# Patient Record
Sex: MALE | Race: OTHER | Employment: FULL TIME | URBAN - METROPOLITAN AREA
[De-identification: names, ages, dates, MRNs, and addresses within clinical notes are randomized per-mention and may not be internally consistent; named-entity substitution may affect disease eponyms.]

---

## 2019-09-01 ENCOUNTER — APPOINTMENT (OUTPATIENT)
Dept: GENERAL RADIOLOGY | Age: 19
End: 2019-09-01
Payer: COMMERCIAL

## 2019-09-01 ENCOUNTER — HOSPITAL ENCOUNTER (EMERGENCY)
Age: 19
Discharge: HOME | End: 2019-09-02
Attending: EMERGENCY MEDICINE
Payer: COMMERCIAL

## 2019-09-01 VITALS
SYSTOLIC BLOOD PRESSURE: 134 MMHG | BODY MASS INDEX: 25.18 KG/M2 | TEMPERATURE: 98.6 F | DIASTOLIC BLOOD PRESSURE: 77 MMHG | OXYGEN SATURATION: 96 % | HEIGHT: 69 IN | RESPIRATION RATE: 16 BRPM | HEART RATE: 73 BPM | WEIGHT: 170 LBS

## 2019-09-01 DIAGNOSIS — K29.70 GASTRITIS, PRESENCE OF BLEEDING UNSPECIFIED, UNSPECIFIED CHRONICITY, UNSPECIFIED GASTRITIS TYPE: Primary | ICD-10-CM

## 2019-09-01 PROCEDURE — 82248 BILIRUBIN DIRECT: CPT

## 2019-09-01 PROCEDURE — 99285 EMERGENCY DEPT VISIT HI MDM: CPT

## 2019-09-01 PROCEDURE — 83690 ASSAY OF LIPASE: CPT

## 2019-09-01 PROCEDURE — 85025 COMPLETE CBC W/AUTO DIFF WBC: CPT

## 2019-09-01 PROCEDURE — 80053 COMPREHEN METABOLIC PANEL: CPT

## 2019-09-01 RX ORDER — ONDANSETRON 2 MG/ML
4 INJECTION INTRAMUSCULAR; INTRAVENOUS ONCE
Status: COMPLETED | OUTPATIENT
Start: 2019-09-01 | End: 2019-09-02

## 2019-09-01 RX ORDER — PANTOPRAZOLE SODIUM 40 MG/10ML
80 INJECTION, POWDER, LYOPHILIZED, FOR SOLUTION INTRAVENOUS ONCE
Status: COMPLETED | OUTPATIENT
Start: 2019-09-01 | End: 2019-09-02

## 2019-09-01 RX ORDER — 0.9 % SODIUM CHLORIDE 0.9 %
500 INTRAVENOUS SOLUTION INTRAVENOUS ONCE
Status: COMPLETED | OUTPATIENT
Start: 2019-09-01 | End: 2019-09-02

## 2019-09-01 ASSESSMENT — PAIN SCALES - GENERAL: PAINLEVEL_OUTOF10: 6

## 2019-09-01 ASSESSMENT — PAIN DESCRIPTION - DESCRIPTORS: DESCRIPTORS: SQUEEZING

## 2019-09-01 ASSESSMENT — PAIN DESCRIPTION - FREQUENCY: FREQUENCY: INTERMITTENT

## 2019-09-01 ASSESSMENT — PAIN - FUNCTIONAL ASSESSMENT: PAIN_FUNCTIONAL_ASSESSMENT: PREVENTS OR INTERFERES SOME ACTIVE ACTIVITIES AND ADLS

## 2019-09-01 ASSESSMENT — PAIN DESCRIPTION - PAIN TYPE: TYPE: ACUTE PAIN

## 2019-09-01 ASSESSMENT — PAIN DESCRIPTION - LOCATION: LOCATION: ABDOMEN

## 2019-09-01 ASSESSMENT — PAIN DESCRIPTION - ONSET: ONSET: ON-GOING

## 2019-09-01 ASSESSMENT — PAIN DESCRIPTION - PROGRESSION: CLINICAL_PROGRESSION: GRADUALLY WORSENING

## 2019-09-02 ENCOUNTER — APPOINTMENT (OUTPATIENT)
Dept: GENERAL RADIOLOGY | Age: 19
End: 2019-09-02
Payer: COMMERCIAL

## 2019-09-02 ENCOUNTER — APPOINTMENT (OUTPATIENT)
Dept: ULTRASOUND IMAGING | Age: 19
End: 2019-09-02
Payer: COMMERCIAL

## 2019-09-02 LAB
ALBUMIN SERPL-MCNC: 4.2 GM/DL (ref 3.4–5)
ALP BLD-CCNC: 80 IU/L (ref 40–129)
ALT SERPL-CCNC: 16 U/L (ref 10–40)
ANION GAP SERPL CALCULATED.3IONS-SCNC: 10 MMOL/L (ref 4–16)
AST SERPL-CCNC: 30 IU/L (ref 15–37)
BASOPHILS ABSOLUTE: 0 K/CU MM
BASOPHILS RELATIVE PERCENT: 0.3 % (ref 0–1)
BILIRUB SERPL-MCNC: 0.2 MG/DL (ref 0–1)
BILIRUBIN DIRECT: 0.2 MG/DL (ref 0–0.3)
BILIRUBIN, INDIRECT: 0 MG/DL (ref 0–0.7)
BUN BLDV-MCNC: 10 MG/DL (ref 6–23)
CALCIUM SERPL-MCNC: 8.7 MG/DL (ref 8.3–10.6)
CHLORIDE BLD-SCNC: 101 MMOL/L (ref 99–110)
CO2: 23 MMOL/L (ref 21–32)
CREAT SERPL-MCNC: 0.8 MG/DL (ref 0.9–1.3)
DIFFERENTIAL TYPE: ABNORMAL
EOSINOPHILS ABSOLUTE: 0.1 K/CU MM
EOSINOPHILS RELATIVE PERCENT: 1.6 % (ref 0–3)
GFR AFRICAN AMERICAN: >60 ML/MIN/1.73M2
GFR NON-AFRICAN AMERICAN: >60 ML/MIN/1.73M2
GLUCOSE BLD-MCNC: 105 MG/DL (ref 70–99)
HCT VFR BLD CALC: 44.4 % (ref 42–52)
HEMOGLOBIN: 14.3 GM/DL (ref 13.5–18)
IMMATURE NEUTROPHIL %: 0.3 % (ref 0–0.43)
LIPASE: 28 IU/L (ref 13–60)
LYMPHOCYTES ABSOLUTE: 2.1 K/CU MM
LYMPHOCYTES RELATIVE PERCENT: 23.5 % (ref 24–44)
MCH RBC QN AUTO: 30.6 PG (ref 27–31)
MCHC RBC AUTO-ENTMCNC: 32.2 % (ref 32–36)
MCV RBC AUTO: 94.9 FL (ref 78–100)
MONOCYTES ABSOLUTE: 0.6 K/CU MM
MONOCYTES RELATIVE PERCENT: 6.9 % (ref 0–4)
NUCLEATED RBC %: 0 %
PDW BLD-RTO: 12.5 % (ref 11.7–14.9)
PLATELET # BLD: 181 K/CU MM (ref 140–440)
PMV BLD AUTO: 9.9 FL (ref 7.5–11.1)
POTASSIUM SERPL-SCNC: 4.1 MMOL/L (ref 3.5–5.1)
RBC # BLD: 4.68 M/CU MM (ref 4.6–6.2)
SEGMENTED NEUTROPHILS ABSOLUTE COUNT: 6 K/CU MM
SEGMENTED NEUTROPHILS RELATIVE PERCENT: 67.4 % (ref 36–66)
SODIUM BLD-SCNC: 134 MMOL/L (ref 135–145)
TOTAL IMMATURE NEUTOROPHIL: 0.03 K/CU MM
TOTAL NUCLEATED RBC: 0 K/CU MM
TOTAL PROTEIN: 6.7 GM/DL (ref 6.4–8.2)
WBC # BLD: 8.9 K/CU MM (ref 4–10.5)

## 2019-09-02 PROCEDURE — C9113 INJ PANTOPRAZOLE SODIUM, VIA: HCPCS | Performed by: EMERGENCY MEDICINE

## 2019-09-02 PROCEDURE — 2580000003 HC RX 258: Performed by: EMERGENCY MEDICINE

## 2019-09-02 PROCEDURE — 96375 TX/PRO/DX INJ NEW DRUG ADDON: CPT

## 2019-09-02 PROCEDURE — 96374 THER/PROPH/DIAG INJ IV PUSH: CPT

## 2019-09-02 PROCEDURE — 71045 X-RAY EXAM CHEST 1 VIEW: CPT

## 2019-09-02 PROCEDURE — 76705 ECHO EXAM OF ABDOMEN: CPT

## 2019-09-02 PROCEDURE — 6360000002 HC RX W HCPCS: Performed by: EMERGENCY MEDICINE

## 2019-09-02 RX ORDER — ONDANSETRON 4 MG/1
4 TABLET, ORALLY DISINTEGRATING ORAL EVERY 8 HOURS PRN
Qty: 15 TABLET | Refills: 0 | Status: ON HOLD | OUTPATIENT
Start: 2019-09-02 | End: 2020-01-03 | Stop reason: HOSPADM

## 2019-09-02 RX ORDER — PANTOPRAZOLE SODIUM 40 MG/1
40 TABLET, DELAYED RELEASE ORAL
Qty: 30 TABLET | Refills: 1 | Status: ON HOLD | OUTPATIENT
Start: 2019-09-02 | End: 2020-01-03 | Stop reason: SDUPTHER

## 2019-09-02 RX ADMIN — ONDANSETRON 4 MG: 2 INJECTION INTRAMUSCULAR; INTRAVENOUS at 00:02

## 2019-09-02 RX ADMIN — PANTOPRAZOLE SODIUM 80 MG: 40 INJECTION, POWDER, FOR SOLUTION INTRAVENOUS at 00:02

## 2019-09-02 RX ADMIN — SODIUM CHLORIDE 500 ML: 9 INJECTION, SOLUTION INTRAVENOUS at 00:02

## 2019-09-02 ASSESSMENT — ENCOUNTER SYMPTOMS
NAUSEA: 1
SORE THROAT: 0
COUGH: 0
BACK PAIN: 0
VOMITING: 1
ABDOMINAL PAIN: 1
SHORTNESS OF BREATH: 0
COLOR CHANGE: 0

## 2019-09-02 NOTE — ED PROVIDER NOTES
Temp Source 09/01/19 2330 Oral      SpO2 09/01/19 2330 96 %      Weight 09/01/19 2329 170 lb (77.1 kg)      Height 09/01/19 2329 5' 9\" (1.753 m)      Head Circumference --       Peak Flow --       Pain Score --       Pain Loc --       Pain Edu? --       Excl. in [address]? --      Physical Exam   Constitutional:   Non-toxic appearance, able to converse with me   HENT:   Head: Normocephalic and atraumatic. Mouth/Throat: No oropharyngeal exudate. Eyes: Right eye exhibits no discharge. Left eye exhibits no discharge. Patient is tracking me as I am walking around the room without noted EOM palsy   Neck: No tracheal deviation present. Cardiovascular: Intact distal pulses. Exam reveals no gallop and no friction rub. Pulmonary/Chest: No respiratory distress. He has no wheezes. He has no rales. Abdominal: Soft. There is tenderness (epigastric). There is no guarding. Musculoskeletal: He exhibits no tenderness or deformity. Neurological: He is alert. Skin: Skin is warm and dry.    Psychiatric: Judgment normal.            I have reviewed and interpreted all of the currently available lab results from this visit (if applicable):  Results for orders placed or performed during the hospital encounter of 09/01/19   CBC Auto Differential   Result Value Ref Range    WBC 8.9 4.0 - 10.5 K/CU MM    RBC 4.68 4.6 - 6.2 M/CU MM    Hemoglobin 14.3 13.5 - 18.0 GM/DL    Hematocrit 44.4 42 - 52 %    MCV 94.9 78 - 100 FL    MCH 30.6 27 - 31 PG    MCHC 32.2 32.0 - 36.0 %    RDW 12.5 11.7 - 14.9 %    Platelets 256 259 - 365 K/CU MM    MPV 9.9 7.5 - 11.1 FL    Differential Type AUTOMATED DIFFERENTIAL     Segs Relative 67.4 (H) 36 - 66 %    Lymphocytes % 23.5 (L) 24 - 44 %    Monocytes % 6.9 (H) 0 - 4 %    Eosinophils % 1.6 0 - 3 %    Basophils % 0.3 0 - 1 %    Segs Absolute 6.0 K/CU MM    Lymphocytes Absolute 2.1 K/CU MM    Monocytes Absolute 0.6 K/CU MM    Eosinophils Absolute 0.1 K/CU MM    Basophils Absolute 0.0 K/CU MM    Nucleated

## 2019-10-19 ENCOUNTER — HOSPITAL ENCOUNTER (EMERGENCY)
Age: 19
Discharge: HOME | End: 2019-10-19
Attending: EMERGENCY MEDICINE
Payer: COMMERCIAL

## 2019-10-19 VITALS
DIASTOLIC BLOOD PRESSURE: 88 MMHG | TEMPERATURE: 98.3 F | OXYGEN SATURATION: 99 % | WEIGHT: 170 LBS | BODY MASS INDEX: 25.18 KG/M2 | HEIGHT: 69 IN | SYSTOLIC BLOOD PRESSURE: 137 MMHG | HEART RATE: 63 BPM | RESPIRATION RATE: 18 BRPM

## 2019-10-19 DIAGNOSIS — Z86.59 HISTORY OF POST TRAUMATIC STRESS DISORDER: Primary | ICD-10-CM

## 2019-10-19 PROCEDURE — 99281 EMR DPT VST MAYX REQ PHY/QHP: CPT

## 2019-12-17 ENCOUNTER — APPOINTMENT (OUTPATIENT)
Dept: GENERAL RADIOLOGY | Age: 19
End: 2019-12-17
Payer: COMMERCIAL

## 2019-12-17 ENCOUNTER — HOSPITAL ENCOUNTER (EMERGENCY)
Age: 19
Discharge: OTHER INSTITUTION | End: 2019-12-17
Attending: EMERGENCY MEDICINE
Payer: COMMERCIAL

## 2019-12-17 VITALS
HEIGHT: 69 IN | BODY MASS INDEX: 25.92 KG/M2 | OXYGEN SATURATION: 96 % | TEMPERATURE: 98.2 F | HEART RATE: 62 BPM | DIASTOLIC BLOOD PRESSURE: 87 MMHG | WEIGHT: 175 LBS | RESPIRATION RATE: 15 BRPM | SYSTOLIC BLOOD PRESSURE: 101 MMHG

## 2019-12-17 DIAGNOSIS — K92.0 HEMATEMESIS WITH NAUSEA: Primary | ICD-10-CM

## 2019-12-17 LAB
ALBUMIN SERPL-MCNC: 4.2 GM/DL (ref 3.4–5)
ALP BLD-CCNC: 76 IU/L (ref 40–129)
ALT SERPL-CCNC: 9 U/L (ref 10–40)
ANION GAP SERPL CALCULATED.3IONS-SCNC: 9 MMOL/L (ref 4–16)
AST SERPL-CCNC: 14 IU/L (ref 15–37)
BASOPHILS ABSOLUTE: 0 K/CU MM
BASOPHILS RELATIVE PERCENT: 0.3 % (ref 0–1)
BILIRUB SERPL-MCNC: 0.5 MG/DL (ref 0–1)
BUN BLDV-MCNC: 7 MG/DL (ref 6–23)
CALCIUM SERPL-MCNC: 9 MG/DL (ref 8.3–10.6)
CHLORIDE BLD-SCNC: 101 MMOL/L (ref 99–110)
CO2: 28 MMOL/L (ref 21–32)
CREAT SERPL-MCNC: 0.9 MG/DL (ref 0.9–1.3)
DIFFERENTIAL TYPE: ABNORMAL
EOSINOPHILS ABSOLUTE: 0 K/CU MM
EOSINOPHILS RELATIVE PERCENT: 0.5 % (ref 0–3)
GFR AFRICAN AMERICAN: >60 ML/MIN/1.73M2
GFR NON-AFRICAN AMERICAN: >60 ML/MIN/1.73M2
GLUCOSE BLD-MCNC: 96 MG/DL (ref 70–99)
HCT VFR BLD CALC: 49 % (ref 42–52)
HEMOGLOBIN: 15.9 GM/DL (ref 13.5–18)
IMMATURE NEUTROPHIL %: 0.4 % (ref 0–0.43)
LYMPHOCYTES ABSOLUTE: 2.2 K/CU MM
LYMPHOCYTES RELATIVE PERCENT: 27.7 % (ref 24–44)
MCH RBC QN AUTO: 30 PG (ref 27–31)
MCHC RBC AUTO-ENTMCNC: 32.4 % (ref 32–36)
MCV RBC AUTO: 92.5 FL (ref 78–100)
MONOCYTES ABSOLUTE: 0.5 K/CU MM
MONOCYTES RELATIVE PERCENT: 6.1 % (ref 0–4)
NUCLEATED RBC %: 0 %
PDW BLD-RTO: 11.9 % (ref 11.7–14.9)
PLATELET # BLD: 189 K/CU MM (ref 140–440)
PMV BLD AUTO: 9.2 FL (ref 7.5–11.1)
POTASSIUM SERPL-SCNC: 3.6 MMOL/L (ref 3.5–5.1)
RBC # BLD: 5.3 M/CU MM (ref 4.6–6.2)
SEGMENTED NEUTROPHILS ABSOLUTE COUNT: 5.1 K/CU MM
SEGMENTED NEUTROPHILS RELATIVE PERCENT: 65 % (ref 36–66)
SODIUM BLD-SCNC: 138 MMOL/L (ref 135–145)
TOTAL IMMATURE NEUTOROPHIL: 0.03 K/CU MM
TOTAL NUCLEATED RBC: 0 K/CU MM
TOTAL PROTEIN: 6.8 GM/DL (ref 6.4–8.2)
WBC # BLD: 7.8 K/CU MM (ref 4–10.5)

## 2019-12-17 PROCEDURE — 99285 EMERGENCY DEPT VISIT HI MDM: CPT

## 2019-12-17 PROCEDURE — 74018 RADEX ABDOMEN 1 VIEW: CPT

## 2019-12-17 PROCEDURE — 80053 COMPREHEN METABOLIC PANEL: CPT

## 2019-12-17 PROCEDURE — 6370000000 HC RX 637 (ALT 250 FOR IP): Performed by: EMERGENCY MEDICINE

## 2019-12-17 PROCEDURE — 85025 COMPLETE CBC W/AUTO DIFF WBC: CPT

## 2019-12-17 RX ORDER — FAMOTIDINE 20 MG/1
20 TABLET, FILM COATED ORAL ONCE
Status: COMPLETED | OUTPATIENT
Start: 2019-12-17 | End: 2019-12-17

## 2019-12-17 RX ORDER — ONDANSETRON 4 MG/1
4 TABLET, ORALLY DISINTEGRATING ORAL ONCE
Status: COMPLETED | OUTPATIENT
Start: 2019-12-17 | End: 2019-12-17

## 2019-12-17 RX ORDER — DICYCLOMINE HCL 20 MG
20 TABLET ORAL ONCE
Status: COMPLETED | OUTPATIENT
Start: 2019-12-17 | End: 2019-12-17

## 2019-12-17 RX ADMIN — ONDANSETRON 4 MG: 4 TABLET, ORALLY DISINTEGRATING ORAL at 01:27

## 2019-12-17 RX ADMIN — FAMOTIDINE 20 MG: 20 TABLET, FILM COATED ORAL at 01:36

## 2019-12-17 RX ADMIN — DICYCLOMINE HYDROCHLORIDE 20 MG: 20 TABLET ORAL at 01:36

## 2019-12-17 ASSESSMENT — PAIN DESCRIPTION - ONSET: ONSET: ON-GOING

## 2019-12-17 ASSESSMENT — PAIN SCALES - GENERAL: PAINLEVEL_OUTOF10: 6

## 2019-12-17 ASSESSMENT — PAIN DESCRIPTION - FREQUENCY: FREQUENCY: CONTINUOUS

## 2019-12-17 ASSESSMENT — PAIN DESCRIPTION - PROGRESSION: CLINICAL_PROGRESSION: NOT CHANGED

## 2019-12-17 ASSESSMENT — PAIN DESCRIPTION - DESCRIPTORS: DESCRIPTORS: ACHING

## 2019-12-17 ASSESSMENT — PAIN DESCRIPTION - PAIN TYPE: TYPE: ACUTE PAIN

## 2019-12-17 ASSESSMENT — PAIN DESCRIPTION - LOCATION: LOCATION: ABDOMEN

## 2020-01-01 ENCOUNTER — HOSPITAL ENCOUNTER (INPATIENT)
Age: 20
LOS: 2 days | Discharge: HOME | DRG: 369 | End: 2020-01-03
Attending: EMERGENCY MEDICINE | Admitting: FAMILY MEDICINE
Payer: COMMERCIAL

## 2020-01-01 ENCOUNTER — APPOINTMENT (OUTPATIENT)
Dept: CT IMAGING | Age: 20
DRG: 369 | End: 2020-01-01
Payer: COMMERCIAL

## 2020-01-01 PROBLEM — K92.0 HEMATEMESIS: Status: ACTIVE | Noted: 2020-01-01

## 2020-01-01 LAB
ABO/RH: NORMAL
ALBUMIN SERPL-MCNC: 4.9 GM/DL (ref 3.4–5)
ALP BLD-CCNC: 67 IU/L (ref 40–129)
ALT SERPL-CCNC: 11 U/L (ref 10–40)
AMPHETAMINES: NEGATIVE
ANION GAP SERPL CALCULATED.3IONS-SCNC: 13 MMOL/L (ref 4–16)
ANTIBODY SCREEN: NEGATIVE
AST SERPL-CCNC: 15 IU/L (ref 15–37)
BACTERIA: NEGATIVE /HPF
BARBITURATE SCREEN URINE: NEGATIVE
BASOPHILS ABSOLUTE: 0.1 K/CU MM
BASOPHILS RELATIVE PERCENT: 0.6 % (ref 0–1)
BENZODIAZEPINE SCREEN, URINE: NEGATIVE
BILIRUB SERPL-MCNC: 0.3 MG/DL (ref 0–1)
BILIRUBIN URINE: NEGATIVE MG/DL
BLOOD, URINE: NEGATIVE
BUN BLDV-MCNC: 10 MG/DL (ref 6–23)
CALCIUM SERPL-MCNC: 9.6 MG/DL (ref 8.3–10.6)
CANNABINOID SCREEN URINE: NEGATIVE
CHLORIDE BLD-SCNC: 99 MMOL/L (ref 99–110)
CLARITY: CLEAR
CO2: 26 MMOL/L (ref 21–32)
COCAINE METABOLITE: NEGATIVE
COLOR: YELLOW
CREAT SERPL-MCNC: 0.9 MG/DL (ref 0.9–1.3)
DIFFERENTIAL TYPE: ABNORMAL
EOSINOPHILS ABSOLUTE: 0.1 K/CU MM
EOSINOPHILS RELATIVE PERCENT: 1.2 % (ref 0–3)
GFR AFRICAN AMERICAN: >60 ML/MIN/1.73M2
GFR NON-AFRICAN AMERICAN: >60 ML/MIN/1.73M2
GLUCOSE BLD-MCNC: 88 MG/DL (ref 70–99)
GLUCOSE, URINE: NEGATIVE MG/DL
HCT VFR BLD CALC: 47.9 % (ref 42–52)
HEMOGLOBIN: 15.9 GM/DL (ref 13.5–18)
IMMATURE NEUTROPHIL %: 0.2 % (ref 0–0.43)
INR BLD: 0.94 INDEX
KETONES, URINE: NEGATIVE MG/DL
LACTATE: 1.8 MMOL/L (ref 0.4–2)
LEUKOCYTE ESTERASE, URINE: NEGATIVE
LIPASE: 17 IU/L (ref 13–60)
LYMPHOCYTES ABSOLUTE: 2.1 K/CU MM
LYMPHOCYTES RELATIVE PERCENT: 25.7 % (ref 24–44)
MCH RBC QN AUTO: 30.5 PG (ref 27–31)
MCHC RBC AUTO-ENTMCNC: 33.2 % (ref 32–36)
MCV RBC AUTO: 91.9 FL (ref 78–100)
MONOCYTES ABSOLUTE: 0.6 K/CU MM
MONOCYTES RELATIVE PERCENT: 7.4 % (ref 0–4)
MUCUS: ABNORMAL HPF
NITRITE URINE, QUANTITATIVE: NEGATIVE
NUCLEATED RBC %: 0 %
OPIATES, URINE: NEGATIVE
OXYCODONE: NORMAL
PDW BLD-RTO: 12 % (ref 11.7–14.9)
PH, URINE: 7 (ref 5–8)
PHENCYCLIDINE, URINE: NEGATIVE
PLATELET # BLD: 190 K/CU MM (ref 140–440)
PMV BLD AUTO: 10.2 FL (ref 7.5–11.1)
POTASSIUM SERPL-SCNC: 3.6 MMOL/L (ref 3.5–5.1)
PROTEIN UA: NEGATIVE MG/DL
PROTHROMBIN TIME: 11.4 SECONDS (ref 11.7–14.5)
RBC # BLD: 5.21 M/CU MM (ref 4.6–6.2)
RBC URINE: 1 /HPF (ref 0–3)
SEGMENTED NEUTROPHILS ABSOLUTE COUNT: 5.4 K/CU MM
SEGMENTED NEUTROPHILS RELATIVE PERCENT: 64.9 % (ref 36–66)
SODIUM BLD-SCNC: 138 MMOL/L (ref 135–145)
SPECIFIC GRAVITY UA: 1.05 (ref 1–1.03)
SPECIFIC GRAVITY UA: ABNORMAL (ref 1–1.03)
SQUAMOUS EPITHELIAL: <1 /HPF
TOTAL IMMATURE NEUTOROPHIL: 0.02 K/CU MM
TOTAL NUCLEATED RBC: 0 K/CU MM
TOTAL PROTEIN: 7.7 GM/DL (ref 6.4–8.2)
TRICHOMONAS: ABNORMAL /HPF
UROBILINOGEN, URINE: NORMAL MG/DL (ref 0.2–1)
WBC # BLD: 8.3 K/CU MM (ref 4–10.5)
WBC UA: 2 /HPF (ref 0–2)

## 2020-01-01 PROCEDURE — 96376 TX/PRO/DX INJ SAME DRUG ADON: CPT

## 2020-01-01 PROCEDURE — 99285 EMERGENCY DEPT VISIT HI MDM: CPT

## 2020-01-01 PROCEDURE — 96375 TX/PRO/DX INJ NEW DRUG ADDON: CPT

## 2020-01-01 PROCEDURE — 85610 PROTHROMBIN TIME: CPT

## 2020-01-01 PROCEDURE — 96361 HYDRATE IV INFUSION ADD-ON: CPT

## 2020-01-01 PROCEDURE — 1200000000 HC SEMI PRIVATE

## 2020-01-01 PROCEDURE — 6360000002 HC RX W HCPCS: Performed by: NURSE PRACTITIONER

## 2020-01-01 PROCEDURE — 83605 ASSAY OF LACTIC ACID: CPT

## 2020-01-01 PROCEDURE — 86900 BLOOD TYPING SEROLOGIC ABO: CPT

## 2020-01-01 PROCEDURE — 36415 COLL VENOUS BLD VENIPUNCTURE: CPT

## 2020-01-01 PROCEDURE — C9113 INJ PANTOPRAZOLE SODIUM, VIA: HCPCS | Performed by: NURSE PRACTITIONER

## 2020-01-01 PROCEDURE — 6360000004 HC RX CONTRAST MEDICATION: Performed by: EMERGENCY MEDICINE

## 2020-01-01 PROCEDURE — 86901 BLOOD TYPING SEROLOGIC RH(D): CPT

## 2020-01-01 PROCEDURE — C9113 INJ PANTOPRAZOLE SODIUM, VIA: HCPCS | Performed by: EMERGENCY MEDICINE

## 2020-01-01 PROCEDURE — 96367 TX/PROPH/DG ADDL SEQ IV INF: CPT

## 2020-01-01 PROCEDURE — 96365 THER/PROPH/DIAG IV INF INIT: CPT

## 2020-01-01 PROCEDURE — 96374 THER/PROPH/DIAG INJ IV PUSH: CPT

## 2020-01-01 PROCEDURE — 85025 COMPLETE CBC W/AUTO DIFF WBC: CPT

## 2020-01-01 PROCEDURE — G0378 HOSPITAL OBSERVATION PER HR: HCPCS

## 2020-01-01 PROCEDURE — 81001 URINALYSIS AUTO W/SCOPE: CPT

## 2020-01-01 PROCEDURE — 86850 RBC ANTIBODY SCREEN: CPT

## 2020-01-01 PROCEDURE — 94761 N-INVAS EAR/PLS OXIMETRY MLT: CPT

## 2020-01-01 PROCEDURE — 2580000003 HC RX 258: Performed by: EMERGENCY MEDICINE

## 2020-01-01 PROCEDURE — 6360000002 HC RX W HCPCS: Performed by: EMERGENCY MEDICINE

## 2020-01-01 PROCEDURE — 83690 ASSAY OF LIPASE: CPT

## 2020-01-01 PROCEDURE — 74177 CT ABD & PELVIS W/CONTRAST: CPT

## 2020-01-01 PROCEDURE — 2580000003 HC RX 258: Performed by: NURSE PRACTITIONER

## 2020-01-01 PROCEDURE — 80053 COMPREHEN METABOLIC PANEL: CPT

## 2020-01-01 PROCEDURE — 80307 DRUG TEST PRSMV CHEM ANLYZR: CPT

## 2020-01-01 PROCEDURE — 2500000003 HC RX 250 WO HCPCS: Performed by: NURSE PRACTITIONER

## 2020-01-01 RX ORDER — PANTOPRAZOLE SODIUM 40 MG/10ML
40 INJECTION, POWDER, LYOPHILIZED, FOR SOLUTION INTRAVENOUS ONCE
Status: COMPLETED | OUTPATIENT
Start: 2020-01-01 | End: 2020-01-01

## 2020-01-01 RX ORDER — METHYLPREDNISOLONE SODIUM SUCCINATE 40 MG/ML
40 INJECTION, POWDER, LYOPHILIZED, FOR SOLUTION INTRAMUSCULAR; INTRAVENOUS EVERY 6 HOURS
Status: DISCONTINUED | OUTPATIENT
Start: 2020-01-01 | End: 2020-01-02

## 2020-01-01 RX ORDER — ACETAMINOPHEN 325 MG/1
650 TABLET ORAL EVERY 4 HOURS PRN
Status: DISCONTINUED | OUTPATIENT
Start: 2020-01-01 | End: 2020-01-03 | Stop reason: HOSPADM

## 2020-01-01 RX ORDER — SODIUM CHLORIDE 9 MG/ML
INJECTION, SOLUTION INTRAVENOUS CONTINUOUS
Status: DISPENSED | OUTPATIENT
Start: 2020-01-01 | End: 2020-01-02

## 2020-01-01 RX ORDER — SODIUM CHLORIDE 0.9 % (FLUSH) 0.9 %
10 SYRINGE (ML) INJECTION EVERY 12 HOURS SCHEDULED
Status: DISCONTINUED | OUTPATIENT
Start: 2020-01-01 | End: 2020-01-03 | Stop reason: HOSPADM

## 2020-01-01 RX ORDER — MORPHINE SULFATE 4 MG/ML
4 INJECTION, SOLUTION INTRAMUSCULAR; INTRAVENOUS EVERY 4 HOURS PRN
Status: DISCONTINUED | OUTPATIENT
Start: 2020-01-01 | End: 2020-01-03 | Stop reason: HOSPADM

## 2020-01-01 RX ORDER — PANTOPRAZOLE SODIUM 40 MG/10ML
40 INJECTION, POWDER, LYOPHILIZED, FOR SOLUTION INTRAVENOUS 2 TIMES DAILY
Status: DISCONTINUED | OUTPATIENT
Start: 2020-01-01 | End: 2020-01-03 | Stop reason: HOSPADM

## 2020-01-01 RX ORDER — 0.9 % SODIUM CHLORIDE 0.9 %
1000 INTRAVENOUS SOLUTION INTRAVENOUS ONCE
Status: COMPLETED | OUTPATIENT
Start: 2020-01-01 | End: 2020-01-01

## 2020-01-01 RX ORDER — SODIUM CHLORIDE 0.9 % (FLUSH) 0.9 %
10 SYRINGE (ML) INJECTION PRN
Status: DISCONTINUED | OUTPATIENT
Start: 2020-01-01 | End: 2020-01-03 | Stop reason: HOSPADM

## 2020-01-01 RX ORDER — CIPROFLOXACIN 2 MG/ML
400 INJECTION, SOLUTION INTRAVENOUS EVERY 12 HOURS
Status: DISCONTINUED | OUTPATIENT
Start: 2020-01-01 | End: 2020-01-03 | Stop reason: HOSPADM

## 2020-01-01 RX ORDER — ONDANSETRON 2 MG/ML
4 INJECTION INTRAMUSCULAR; INTRAVENOUS EVERY 6 HOURS PRN
Status: DISCONTINUED | OUTPATIENT
Start: 2020-01-01 | End: 2020-01-03 | Stop reason: HOSPADM

## 2020-01-01 RX ORDER — CIPROFLOXACIN 2 MG/ML
400 INJECTION, SOLUTION INTRAVENOUS EVERY 12 HOURS
Status: DISCONTINUED | OUTPATIENT
Start: 2020-01-01 | End: 2020-01-01

## 2020-01-01 RX ADMIN — PANTOPRAZOLE SODIUM 40 MG: 40 INJECTION, POWDER, FOR SOLUTION INTRAVENOUS at 10:42

## 2020-01-01 RX ADMIN — SODIUM CHLORIDE 1000 ML: 9 INJECTION, SOLUTION INTRAVENOUS at 10:42

## 2020-01-01 RX ADMIN — SODIUM CHLORIDE: 9 INJECTION, SOLUTION INTRAVENOUS at 16:40

## 2020-01-01 RX ADMIN — MORPHINE SULFATE 4 MG: 4 INJECTION, SOLUTION INTRAMUSCULAR; INTRAVENOUS at 21:35

## 2020-01-01 RX ADMIN — METRONIDAZOLE 500 MG: 500 INJECTION, SOLUTION INTRAVENOUS at 16:41

## 2020-01-01 RX ADMIN — IOPAMIDOL 100 ML: 755 INJECTION, SOLUTION INTRAVENOUS at 11:41

## 2020-01-01 RX ADMIN — SODIUM CHLORIDE, PRESERVATIVE FREE 10 ML: 5 INJECTION INTRAVENOUS at 21:36

## 2020-01-01 RX ADMIN — ONDANSETRON 4 MG: 2 INJECTION INTRAMUSCULAR; INTRAVENOUS at 10:42

## 2020-01-01 RX ADMIN — MORPHINE SULFATE 4 MG: 4 INJECTION, SOLUTION INTRAMUSCULAR; INTRAVENOUS at 15:32

## 2020-01-01 RX ADMIN — CIPROFLOXACIN 400 MG: 2 INJECTION, SOLUTION INTRAVENOUS at 17:49

## 2020-01-01 RX ADMIN — METHYLPREDNISOLONE SODIUM SUCCINATE 40 MG: 40 INJECTION, POWDER, FOR SOLUTION INTRAMUSCULAR; INTRAVENOUS at 16:34

## 2020-01-01 RX ADMIN — ONDANSETRON 4 MG: 2 INJECTION INTRAMUSCULAR; INTRAVENOUS at 15:32

## 2020-01-01 RX ADMIN — PANTOPRAZOLE SODIUM 40 MG: 40 INJECTION, POWDER, FOR SOLUTION INTRAVENOUS at 21:35

## 2020-01-01 RX ADMIN — METHYLPREDNISOLONE SODIUM SUCCINATE 40 MG: 40 INJECTION, POWDER, FOR SOLUTION INTRAMUSCULAR; INTRAVENOUS at 21:35

## 2020-01-01 ASSESSMENT — PAIN SCALES - GENERAL
PAINLEVEL_OUTOF10: 9
PAINLEVEL_OUTOF10: 8
PAINLEVEL_OUTOF10: 7
PAINLEVEL_OUTOF10: 7

## 2020-01-01 ASSESSMENT — PAIN DESCRIPTION - FREQUENCY: FREQUENCY: CONTINUOUS

## 2020-01-01 ASSESSMENT — PAIN DESCRIPTION - PAIN TYPE
TYPE: ACUTE PAIN
TYPE: ACUTE PAIN

## 2020-01-01 ASSESSMENT — PAIN DESCRIPTION - LOCATION
LOCATION: ABDOMEN
LOCATION: ABDOMEN

## 2020-01-01 ASSESSMENT — PAIN DESCRIPTION - PROGRESSION: CLINICAL_PROGRESSION: GRADUALLY WORSENING

## 2020-01-01 ASSESSMENT — PAIN DESCRIPTION - ONSET: ONSET: PROGRESSIVE

## 2020-01-01 ASSESSMENT — PAIN DESCRIPTION - DESCRIPTORS: DESCRIPTORS: SHARP

## 2020-01-01 NOTE — ED NOTES
Bed: H-02  Expected date: 1/1/20  Expected time:   Means of arrival: [facility] Department  Comments:     Daniele Vasquez.  TATI Arana  01/01/20 1016

## 2020-01-01 NOTE — ED PROVIDER NOTES
Triage Chief Complaint:   Abdominal Pain and Emesis      HPI:  Rena Moscoso is a 22 y.o. male that presents with abdominal pain, hematemesis. He states that he started getting sick a few days ago. States his abdomen started become more distended, painful all over. States he started having nausea with vomiting. States that this morning he threw up quite a bit of bright red blood. States that he had some blood in his stool intermittently in the last few days. States it was diarrhea like stool, sometimes with blood and sometimes dark brown. He reports history of Crohn's disease, has been taking Protonix but has not been taking the Strattera which he was on previously. He is currently incarcerated. He is not followed up with his GI and a while, used to see one in [place], does not have one in this area. He is not on any blood thinners. He denies any history of surgery associated with Crohn's disease or other abdominal surgeries. He denies foreign body ingestion. Reports occasional alcohol use, denies daily use.       ROS:  General:  No fevers, no chills  Eyes:  No recent vison changes  ENT:  No sore throat, no nasal congestion  Cardiovascular:  No chest pain, no palpitations  Respiratory:  No shortness of breath, no cough, no wheezing  Gastrointestinal:  + pain, + nausea, + vomiting, + diarrhea  Musculoskeletal:  No muscle pain, no joint pain  Skin:  No rash, no pruritis, no easy bruising  Neurologic:  No headache or weakness  Genitourinary:  No dysuria, no hematuria  Extremities:  no edema, no pain    Past Medical History:   Diagnosis Date    ADHD (attention deficit hyperactivity disorder)     Conduct disorder      Past Surgical History:   Procedure Laterality Date    SHOULDER ARTHROSCOPY Right 6-    TEAR DUCT SURGERY      flushed     Family History   Adopted: Yes     Social History     Socioeconomic History    Marital status: Single     Spouse name: Not on file    Number of children: Not on file    Years of education: Not on file    Highest education level: Not on file   Occupational History    Not on file   Social Needs    Financial resource strain: Not on file    Food insecurity:     Worry: Not on file     Inability: Not on file    Transportation needs:     Medical: Not on file     Non-medical: Not on file   Tobacco Use    Smoking status: Current Every Day Smoker     Packs/day: 2.00     Types: Cigarettes    Smokeless tobacco: Former User   Substance and Sexual Activity    Alcohol use:  Yes    Drug use: Yes     Types: Marijuana     Comment: Meth    Sexual activity: Not on file   Lifestyle    Physical activity:     Days per week: Not on file     Minutes per session: Not on file    Stress: Not on file   Relationships    Social connections:     Talks on phone: Not on file     Gets together: Not on file     Attends Anabaptist service: Not on file     Active member of club or organization: Not on file     Attends meetings of clubs or organizations: Not on file     Relationship status: Not on file    Intimate partner violence:     Fear of current or ex partner: Not on file     Emotionally abused: Not on file     Physically abused: Not on file     Forced sexual activity: Not on file   Other Topics Concern    Not on file   Social History Narrative    Not on file     Current Facility-Administered Medications   Medication Dose Route Frequency Provider Last Rate Last Dose    0.9 % sodium chloride bolus  1,000 mL Intravenous Once Antelmo Sprain, DO        pantoprazole (PROTONIX) injection 40 mg  40 mg Intravenous Once Antelmo Sprain, DO        ondansetron [facility]) injection 4 mg  4 mg Intravenous Q6H PRN Antelmo Sprain, DO         Current Outpatient Medications   Medication Sig Dispense Refill    ondansetron (ZOFRAN ODT) 4 MG disintegrating tablet Take 1 tablet by mouth every 8 hours as needed for Nausea 15 tablet 0    hyoscyamine (LEVSIN/SL) 125 MCG sublingual tablet Place 1 tablet under the tongue every 4 hours as needed for Cramping 15 tablet 3    pantoprazole (PROTONIX) 40 MG tablet Take 1 tablet by mouth every morning (before breakfast) 30 tablet 1    cyclobenzaprine (FLEXERIL) 10 MG tablet Take 1 tablet by mouth 3 times daily as needed for Muscle spasms 15 tablet 0    omeprazole (PRILOSEC) 20 MG delayed release capsule Take 1 capsule by mouth daily 30 capsule 0    ondansetron (ZOFRAN ODT) 4 MG disintegrating tablet Take 1 tablet by mouth every 6 hours 10 tablet 0     No Known Allergies    Nursing Notes Reviewed    Physical Exam:  ED Triage Vitals [01/01/20 1017]   Enc Vitals Group      /86      Pulse 66      Resp 18      Temp 98 °F (36.7 °C)      Temp Source Oral      SpO2 98 %      Weight 140 lb (63.5 kg)      Height 5' 9\" (1.753 m)      Head Circumference       Peak Flow       Pain Score       Pain Loc       Pain Edu? Excl. in [address]? General appearance: Awake, alert, No acute distress. Neck:  Supple without rigidity  ENT: Normal posterior pharynx, moist mucus membranes  Heart:  Regular rate and rhythm, no murmurs  Respiratory:  Lungs clear to auscultation bilaterally. Normal effort. Abdominal:  moderate tenderness to palpation, decreased bowel sounds, abdomen slightly distended, slightly firm. No peritoneal signs. Back:  No CVA tenderness. Extremity: normal ROM, no edema  Skin: Warm. Dry. No rash. Neurological:  Alert and oriented. No focal deficits. Speech normal.  Psyc: Normal mood and affect    I have reviewed and interpreted all of the currently available lab results from this visit (if applicable):  No results found for this visit on 01/01/20. Chart review shows recent radiographs:  Xr Abdomen (kub) (single Ap View)    Result Date: 12/17/2019  EXAMINATION: ONE SUPINE XRAY VIEW(S) OF THE ABDOMEN 12/17/2019 1:57 am COMPARISON: None.  HISTORY: ORDERING SYSTEM PROVIDED HISTORY: swallowed fb TECHNOLOGIST PROVIDED HISTORY: Abd KUB Reason for exam:->swallowed fb

## 2020-01-01 NOTE — ED NOTES
The patient was given [employer] and the officer a boxed lunch per this nurse.       Da Bernal RN  01/01/20 [id number]

## 2020-01-01 NOTE — H&P
History and Physical      Name:  Janette Finnegan /Age/Sex: 2000  (22 y.o. male)   MRN & CSN:  [id number] & [id number] Admission Date/Time: 2020 10:15 AM   Location:  [facility]/04TR-04 PCP: Maximo López MD       Hospital Day: 1    Discussed patient and POC with Dr. Esthela De La Vega and Plan:     Janette Finnegan is a 22 y.o.  male  who presents with hematemesis    - Hematemesis/melanotic stool   Crohns flare v early SBO  CT ab/pelv: enteritis v early SBO  1 episode hematemesis today; ~ 3 of melanotic stool over the last 3 days  denies trauma, ingestion of foreign body  Protonix iv bid  Check ESR, CRP  Gi consult  IVF   NPO  Get type and screen  Check H & H at 2100 today; CBC am  Empiric flagyl and cipro    - Crohn's dz  No medical tx recently  Has not established with GI since moving to area  Empiric solumedrol 40 qid       Discussed patient with ED physician    Diet Clear liquid diet; NPO at mn   DVT Prophylaxis [] Lovenox, []  Heparin, [] SCDs, [x] Ambulation   GI Prophylaxis [x] PPI,  [] H2 Blocker,  [] Carafate,  [] Diet/Tube Feeds   Code Status Full    Disposition Patient requires continued admission due to hematemesis   MDM [] Low, [x] Moderate,[]  High  Patient's risk as above due to      [] One or more chronic illnesses with exacerbation progression      [x] Two or more stable chronic illnesses      [x] Undiagnosed new problem with uncertain prognosis      [] Elective major surgery      []Prescription drug management     History of Present Illness:     Chief Complaint: hematemesis    Janette Finnegan is a 22 y.o.  male  who presents from home with hematemesis, 1 episode, today. Context is, patient is in [facility], had 1 small episode of hematemesis today, witnessed by [facility] officers. He denies trauma, ingestion of foreign body. States he has hx of Crohn's disease.   He has not been treated medically for some time, though he at one time was apparently a prescription medication he does not know the name surgery and Shoulder arthroscopy (Right, 6-). Allergies: No Known Allergies    FAM HX: family history is not on file. He was adopted. Soc HX:   Social History     Socioeconomic History    Marital status: Single     Spouse name: None    Number of children: None    Years of education: None    Highest education level: None   Occupational History    None   Social Needs    Financial resource strain: None    Food insecurity:     Worry: None     Inability: None    Transportation needs:     Medical: None     Non-medical: None   Tobacco Use    Smoking status: Current Every Day Smoker     Packs/day: 2.00     Types: Cigarettes    Smokeless tobacco: Former User   Substance and Sexual Activity    Alcohol use:  Yes    Drug use: Yes     Types: Marijuana     Comment: Meth    Sexual activity: None   Lifestyle    Physical activity:     Days per week: None     Minutes per session: None    Stress: None   Relationships    Social connections:     Talks on phone: None     Gets together: None     Attends Religion service: None     Active member of club or organization: None     Attends meetings of clubs or organizations: None     Relationship status: None    Intimate partner violence:     Fear of current or ex partner: None     Emotionally abused: None     Physically abused: None     Forced sexual activity: None   Other Topics Concern    None   Social History Narrative    None       Medications:   Medications:      hyoscyamine (LEVSIN/SL) 125 MCG sublingual tablet Place 1 tablet under the tongue every 4 hours as needed for Cramping Yen Sepulveda MD Needs Review   pantoprazole (PROTONIX) 40 MG tablet Take 1 tablet by mouth every morning (before breakfast) Yen Sepulveda MD Needs Review   omeprazole (PRILOSEC) 20 MG delayed release capsule Take 1 capsule by mouth daily Hermelinda Suarez PA-C Needs Review   ondansetron (ZOFRAN ODT) 4 MG disintegrating tablet Take 1 tablet by mouth every 6 hours Hermelinda Zelaya

## 2020-01-01 NOTE — ED NOTES
Urinal given. The deputy removed one handcuff so the patient could hold the urinal. The ankle shackle is still on the right ankle. The patient is complaining of pain to the LLQ of the abd.      Jen Mejia RN  01/01/20 0093

## 2020-01-02 VITALS
BODY MASS INDEX: 20.73 KG/M2 | HEART RATE: 64 BPM | OXYGEN SATURATION: 98 % | SYSTOLIC BLOOD PRESSURE: 108 MMHG | TEMPERATURE: 97.9 F | WEIGHT: 140 LBS | HEIGHT: 69 IN | RESPIRATION RATE: 16 BRPM | DIASTOLIC BLOOD PRESSURE: 53 MMHG

## 2020-01-02 PROBLEM — Z87.19 HX OF CROHN'S DISEASE: Status: ACTIVE | Noted: 2020-01-02

## 2020-01-02 LAB
ADENOVIRUS F 40 41 PCR: NOT DETECTED
ANION GAP SERPL CALCULATED.3IONS-SCNC: 13 MMOL/L (ref 4–16)
ASTROVIRUS PCR: NOT DETECTED
BASOPHILS ABSOLUTE: 0 K/CU MM
BASOPHILS RELATIVE PERCENT: 0 % (ref 0–1)
BUN BLDV-MCNC: 10 MG/DL (ref 6–23)
CALCIUM SERPL-MCNC: 9.8 MG/DL (ref 8.3–10.6)
CAMPYLOBACTER PCR: NOT DETECTED
CHLORIDE BLD-SCNC: 101 MMOL/L (ref 99–110)
CO2: 25 MMOL/L (ref 21–32)
CREAT SERPL-MCNC: 0.9 MG/DL (ref 0.9–1.3)
CRYPTOSPORIDIUM PCR: NOT DETECTED
CYCLOSPORA CAYETANENSIS PCR: NOT DETECTED
DIFFERENTIAL TYPE: ABNORMAL
E COLI 0157 PCR: NOT DETECTED
E COLI ENTEROAGGREGATIVE PCR: NOT DETECTED
E COLI ENTEROPATHOGENIC PCR: NOT DETECTED
E COLI ENTEROTOXIGENIC PCR: NOT DETECTED
E COLI SHIGA LIKE TOXIN PCR: NOT DETECTED
E COLI SHIGELLA/ENTEROINVASIVE PCR: NOT DETECTED
ENTAMOEBA HISTOLYTICA PCR: NOT DETECTED
EOSINOPHILS ABSOLUTE: 0 K/CU MM
EOSINOPHILS RELATIVE PERCENT: 0 % (ref 0–3)
ERYTHROCYTE SEDIMENTATION RATE: 5 MM/HR (ref 0–15)
GFR AFRICAN AMERICAN: >60 ML/MIN/1.73M2
GFR NON-AFRICAN AMERICAN: >60 ML/MIN/1.73M2
GIARDIA LAMBLIA PCR: NOT DETECTED
GLUCOSE BLD-MCNC: 122 MG/DL (ref 70–99)
HCT VFR BLD CALC: 46.7 % (ref 42–52)
HEMOGLOBIN: 15.5 GM/DL (ref 13.5–18)
HIGH SENSITIVE C-REACTIVE PROTEIN: 0.6 MG/L
IMMATURE NEUTROPHIL %: 0.3 % (ref 0–0.43)
LYMPHOCYTES ABSOLUTE: 0.8 K/CU MM
LYMPHOCYTES RELATIVE PERCENT: 8.7 % (ref 24–44)
MCH RBC QN AUTO: 30.3 PG (ref 27–31)
MCHC RBC AUTO-ENTMCNC: 33.2 % (ref 32–36)
MCV RBC AUTO: 91.2 FL (ref 78–100)
MONOCYTES ABSOLUTE: 0.1 K/CU MM
MONOCYTES RELATIVE PERCENT: 0.7 % (ref 0–4)
NOROVIRUS GI GII PCR: NOT DETECTED
NUCLEATED RBC %: 0 %
PDW BLD-RTO: 11.9 % (ref 11.7–14.9)
PLATELET # BLD: 191 K/CU MM (ref 140–440)
PLESIOMONAS SHIGELLOIDES PCR: NOT DETECTED
PMV BLD AUTO: 9.9 FL (ref 7.5–11.1)
POTASSIUM SERPL-SCNC: 5 MMOL/L (ref 3.5–5.1)
RBC # BLD: 5.12 M/CU MM (ref 4.6–6.2)
ROTAVIRUS A PCR: NOT DETECTED
SALMONELLA PCR: NOT DETECTED
SAPOVIRUS PCR: NOT DETECTED
SEGMENTED NEUTROPHILS ABSOLUTE COUNT: 8.3 K/CU MM
SEGMENTED NEUTROPHILS RELATIVE PERCENT: 90.3 % (ref 36–66)
SODIUM BLD-SCNC: 139 MMOL/L (ref 135–145)
TOTAL IMMATURE NEUTOROPHIL: 0.03 K/CU MM
TOTAL NUCLEATED RBC: 0 K/CU MM
VIBRIO CHOLERAE PCR: NOT DETECTED
VIBRIO PCR: NOT DETECTED
WBC # BLD: 9.1 K/CU MM (ref 4–10.5)
YERSINIA ENTEROCOLITICA PCR: NOT DETECTED

## 2020-01-02 PROCEDURE — 2500000003 HC RX 250 WO HCPCS: Performed by: NURSE PRACTITIONER

## 2020-01-02 PROCEDURE — 87507 IADNA-DNA/RNA PROBE TQ 12-25: CPT

## 2020-01-02 PROCEDURE — 6360000002 HC RX W HCPCS: Performed by: NURSE PRACTITIONER

## 2020-01-02 PROCEDURE — 6370000000 HC RX 637 (ALT 250 FOR IP): Performed by: NURSE PRACTITIONER

## 2020-01-02 PROCEDURE — 96376 TX/PRO/DX INJ SAME DRUG ADON: CPT

## 2020-01-02 PROCEDURE — C9113 INJ PANTOPRAZOLE SODIUM, VIA: HCPCS | Performed by: NURSE PRACTITIONER

## 2020-01-02 PROCEDURE — 36415 COLL VENOUS BLD VENIPUNCTURE: CPT

## 2020-01-02 PROCEDURE — 86141 C-REACTIVE PROTEIN HS: CPT

## 2020-01-02 PROCEDURE — 80048 BASIC METABOLIC PNL TOTAL CA: CPT

## 2020-01-02 PROCEDURE — 96368 THER/DIAG CONCURRENT INF: CPT

## 2020-01-02 PROCEDURE — 85025 COMPLETE CBC W/AUTO DIFF WBC: CPT

## 2020-01-02 PROCEDURE — 1200000000 HC SEMI PRIVATE

## 2020-01-02 PROCEDURE — 96366 THER/PROPH/DIAG IV INF ADDON: CPT

## 2020-01-02 PROCEDURE — G0378 HOSPITAL OBSERVATION PER HR: HCPCS

## 2020-01-02 PROCEDURE — 2580000003 HC RX 258: Performed by: NURSE PRACTITIONER

## 2020-01-02 PROCEDURE — 85652 RBC SED RATE AUTOMATED: CPT

## 2020-01-02 RX ADMIN — PANTOPRAZOLE SODIUM 40 MG: 40 INJECTION, POWDER, FOR SOLUTION INTRAVENOUS at 20:49

## 2020-01-02 RX ADMIN — PANTOPRAZOLE SODIUM 40 MG: 40 INJECTION, POWDER, FOR SOLUTION INTRAVENOUS at 10:02

## 2020-01-02 RX ADMIN — CIPROFLOXACIN 400 MG: 2 INJECTION, SOLUTION INTRAVENOUS at 18:47

## 2020-01-02 RX ADMIN — MORPHINE SULFATE 4 MG: 4 INJECTION, SOLUTION INTRAMUSCULAR; INTRAVENOUS at 02:44

## 2020-01-02 RX ADMIN — MORPHINE SULFATE 4 MG: 4 INJECTION, SOLUTION INTRAMUSCULAR; INTRAVENOUS at 07:52

## 2020-01-02 RX ADMIN — MORPHINE SULFATE 4 MG: 4 INJECTION, SOLUTION INTRAMUSCULAR; INTRAVENOUS at 20:50

## 2020-01-02 RX ADMIN — SODIUM CHLORIDE: 9 INJECTION, SOLUTION INTRAVENOUS at 04:57

## 2020-01-02 RX ADMIN — MORPHINE SULFATE 4 MG: 4 INJECTION, SOLUTION INTRAMUSCULAR; INTRAVENOUS at 11:52

## 2020-01-02 RX ADMIN — MORPHINE SULFATE 4 MG: 4 INJECTION, SOLUTION INTRAMUSCULAR; INTRAVENOUS at 15:53

## 2020-01-02 RX ADMIN — HYOSCYAMINE SULFATE 125 MCG: 0.12 TABLET, ORALLY DISINTEGRATING ORAL at 15:53

## 2020-01-02 RX ADMIN — METHYLPREDNISOLONE SODIUM SUCCINATE 40 MG: 40 INJECTION, POWDER, FOR SOLUTION INTRAMUSCULAR; INTRAVENOUS at 04:57

## 2020-01-02 RX ADMIN — METRONIDAZOLE 500 MG: 500 INJECTION, SOLUTION INTRAVENOUS at 10:02

## 2020-01-02 RX ADMIN — METRONIDAZOLE 500 MG: 500 INJECTION, SOLUTION INTRAVENOUS at 15:53

## 2020-01-02 RX ADMIN — METHYLPREDNISOLONE SODIUM SUCCINATE 40 MG: 40 INJECTION, POWDER, FOR SOLUTION INTRAMUSCULAR; INTRAVENOUS at 10:02

## 2020-01-02 RX ADMIN — CIPROFLOXACIN 400 MG: 2 INJECTION, SOLUTION INTRAVENOUS at 04:57

## 2020-01-02 RX ADMIN — HYOSCYAMINE SULFATE 125 MCG: 0.12 TABLET, ORALLY DISINTEGRATING ORAL at 21:10

## 2020-01-02 RX ADMIN — METRONIDAZOLE 500 MG: 500 INJECTION, SOLUTION INTRAVENOUS at 02:37

## 2020-01-02 ASSESSMENT — PAIN DESCRIPTION - PAIN TYPE: TYPE: CHRONIC PAIN

## 2020-01-02 ASSESSMENT — PAIN SCALES - GENERAL
PAINLEVEL_OUTOF10: 3
PAINLEVEL_OUTOF10: 2
PAINLEVEL_OUTOF10: 7
PAINLEVEL_OUTOF10: 6
PAINLEVEL_OUTOF10: 0
PAINLEVEL_OUTOF10: 7
PAINLEVEL_OUTOF10: 6
PAINLEVEL_OUTOF10: 3
PAINLEVEL_OUTOF10: 6

## 2020-01-02 ASSESSMENT — PAIN DESCRIPTION - ONSET: ONSET: ON-GOING

## 2020-01-02 ASSESSMENT — PAIN SCALES - WONG BAKER: WONGBAKER_NUMERICALRESPONSE: 0

## 2020-01-02 ASSESSMENT — PAIN DESCRIPTION - LOCATION: LOCATION: ABDOMEN

## 2020-01-02 ASSESSMENT — PAIN DESCRIPTION - FREQUENCY: FREQUENCY: CONTINUOUS

## 2020-01-02 ASSESSMENT — PAIN DESCRIPTION - DESCRIPTORS: DESCRIPTORS: SHARP

## 2020-01-02 ASSESSMENT — PAIN DESCRIPTION - DIRECTION: RADIATING_TOWARDS: CHEST
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
## 2020-01-02 NOTE — PROGRESS NOTES
Hospitalist Progress Note         Admit Date: 1/1/2020    PCP: Noreen Lamb MD     Chief Complaint   Patient presents with    Abdominal Pain    Emesis        Assessment and Plan:      Hematemesis H&H stable. ???  Lucía-Rodriguez tear-continue PPI and advance diet. GI evaluated and recommend outpatient EGD/C scope. Continue monitor for now   Hx of Crohn's disease not in flareup. Cipro/Flagyl continued as per GI. Monitor    VTE prophylaxis LMWH. Current Facility-Administered Medications   Medication Dose Route Frequency Provider Last Rate Last Dose    hyoscyamine (LEVSIN/SL) sublingual tablet 125 mcg  125 mcg Sublingual TID YENNI Reeves - CNP   125 mcg at 01/02/20 1553    ondansetron (ZOFRAN) injection 4 mg  4 mg Intravenous Q6H PRN Pegn Euler, APRN - NP   4 mg at 01/01/20 1532    sodium chloride flush 0.9 % injection 10 mL  10 mL Intravenous 2 times per day Peng Euler, APRN - NP   10 mL at 01/01/20 2136    sodium chloride flush 0.9 % injection 10 mL  10 mL Intravenous PRN Peng Euler, APRN - NP        magnesium hydroxide (MILK OF MAGNESIA) 400 MG/5ML suspension 30 mL  30 mL Oral Daily PRN Peng Euler, APRN - NP        acetaminophen (TYLENOL) tablet 650 mg  650 mg Oral Q4H PRN Peng Euler, APRN - NP        pantoprazole (PROTONIX) injection 40 mg  40 mg Intravenous BID Peng Euler, APRN - NP   40 mg at 01/02/20 1002    morphine sulfate (PF) injection 4 mg  4 mg Intravenous Q4H PRN Peng Euler, APRN - NP   4 mg at 01/02/20 1553    metronidazole (FLAGYL) 500 mg in NaCl 100 mL IVPB premix  500 mg Intravenous Q8H Peng Euler, APRN -  mL/hr at 01/02/20 1553 500 mg at 01/02/20 1553    ciprofloxacin (CIPRO) IVPB 400 mg  400 mg Intravenous Q12H Peng Euler, APRN - NP   Stopped at 01/02/20 0600       Subjective:     Patient denied any new complaints. Passing gas. No more hematemesis. Continued nonspecific abdominal pain.   No acute overnight events since admission. Objective: Intake/Output Summary (Last 24 hours) at 1/2/2020 1620  Last data filed at 1/2/2020 1407  Gross per 24 hour   Intake 1820 ml   Output --   Net 1820 ml      Vitals:   Vitals:    01/02/20 1101   BP: 119/70   Pulse: 61   Resp: 19   Temp: 98.2 °F (36.8 °C)   SpO2: 95%     Physical Exam:  General Appearance:  Mild distress 2/2 abdominal pain  Head:      Normocephalic, without obvious abnormality, atraumatic  Eyes:       Conjunctiva/corneas clear, EOM's intact  Lungs:    Clear to auscultation bilaterally, respirations unlabored  Heart:                Regular rate and rhythm, S1 and S2 normal, no murmur,   rub or gallop  Abdomen:     Mildly rigid +, nondistended/mild diffuse tenderness +, diminished BS +  Extremities:   Extremities normal, atraumatic, no cyanosis or edema  Neurological:   Grossly Intact. Significant Diagnostic Studies:   DATA:    CBC   Recent Labs     01/01/20  1020 01/02/20  0413   WBC 8.3 9.1   HGB 15.9 15.5   HCT 47.9 46.7    191      BMP   Recent Labs     01/01/20  1020 01/02/20  0413    139   K 3.6 5.0   CL 99 101   CO2 26 25   BUN 10 10   CREATININE 0.9 0.9     LFT'S   Recent Labs     01/01/20  1020   AST 15   ALT 11   BILITOT 0.3   ALKPHOS 67     COAG   Recent Labs     01/01/20  1020   INR 0.94     POC: No results found for: POCGLU  [id number]:  Lab Results   Component Value Date    LABA1C 5.0 07/14/2015     CARDIAC ENZYMES  No results for input(s): CKTOTAL, CKMB, CKMBINDEX, TROPONINI in the last 72 hours. Troponin: No results for input(s): TROPONINT in the last 72 hours. BNP: No results for input(s): PROBNP in the last 72 hours.   U/A:    Lab Results   Component Value Date    COLORU YELLOW 01/01/2020    WBCUA 2 01/01/2020    RBCUA 1 01/01/2020    MUCUS RARE 01/01/2020    BACTERIA NEGATIVE 01/01/2020    CLARITYU CLEAR 01/01/2020    SPECGRAV 1.046 01/01/2020    SPECGRAV  01/01/2020     (NOTE)  CONSIDER URINE OSMOLARITY TEST IF CLINICALLY INDICATED        LEUKOCYTESUR NEGATIVE 01/01/2020    BLOODU NEGATIVE 01/01/2020    GLUCOSEU Negative 04/07/2016       Xr Abdomen (kub) (single Ap View)    Result Date: 12/17/2019  EXAMINATION: ONE SUPINE XRAY VIEW(S) OF THE ABDOMEN 12/17/2019 1:57 am COMPARISON: None. HISTORY: ORDERING SYSTEM PROVIDED HISTORY: swallowed fb TECHNOLOGIST PROVIDED HISTORY: Abd KUB Reason for exam:->swallowed fb Reason for Exam: swallowed fb Acuity: Acute Type of Exam: Initial FINDINGS: There is a mild to moderate stool load. Bowel gas pattern is otherwise unremarkable. No evidence of a radiopaque foreign body in the abdomen or pelvis. Lung bases are clear. No acute bone finding. Mild to moderate stool load suggests constipation. No evidence of a radiopaque foreign body. Ct Abdomen Pelvis W Iv Contrast    Result Date: 1/1/2020  EXAMINATION: CT OF THE ABDOMEN AND PELVIS WITH CONTRAST 1/1/2020 11:49 am TECHNIQUE: CT of the abdomen and pelvis was performed with the administration of intravenous contrast. Multiplanar reformatted images are provided for review. Dose modulation, iterative reconstruction, and/or weight based adjustment of the mA/kV was utilized to reduce the radiation dose to as low as reasonably achievable. COMPARISON: KUB 12/17/2019. Right upper quadrant ultrasound 09/02/2019. HISTORY: ORDERING SYSTEM PROVIDED HISTORY: abdominal pain, hematemesis, history of crohns TECHNOLOGIST PROVIDED HISTORY: IV contrast only. Thank you. Reason for exam:->abdominal pain, hematemesis, history of crohns Reason for exam:->IV contrast only. Thank you. Reason for Exam: abdominal pain, hematemesis, history of crohns Acuity: Acute Type of Exam: Initial Additional signs and symptoms: 100 mLs total (IV hub broke off during first injection around 30-35 mL's), >60, 20g FINDINGS: Lower Chest: No focal consolidations or pleural effusions.  Organs: Liver, spleen, pancreas, gallbladder, adrenal glands and kidneys are unremarkable. GI/Bowel: There are some prominent air and fluid-filled loops of small bowel noted in the mid abdomen measuring up to 3.6 cm in caliber without definite wall thickening identified. No discrete transition point is identified. Prominent stool throughout large bowel. Air present in the rectum. Appendix not definitely seen, but no findings for acute appendicitis identified. Pelvis: Urinary bladder and prostate appear unremarkable. Peritoneum/Retroperitoneum: No ascites or focal fluid collections. No intraperitoneal free air. Abdominal aorta is normal in caliber. No lymphadenopathy is seen. Bones/Soft Tissues: No acute or suspicious bone or soft tissue abnormality. There are some prominent air and fluid-filled loops of small bowel noted in the mid abdomen measuring up to 3.6 cm in caliber without definite bowel wall thickening identified. No discrete transition point is seen. Findings may reflect an infectious or inflammatory enteritis to include potentially Crohn's disease given the patient's clinical history. Early or partial small bowel obstruction not entirely excluded. Continued follow-up suggested. Somewhat prominent stool throughout large bowel. Correlate clinically as to constipation. Remainder of the exam is unremarkable.            Antonio Canchola  Rounding Hospitalist

## 2020-01-02 NOTE — CONSULTS
[facility] Gastroenterology  Gastroenterology Consultation    2020  8:00 AM    Patient:    Alessio Coffey  : 2000   19 y.o. MRN: [id number]  Admitted: 2020 10:15 AM ATT: Mckenna Tucker MD   1105/1105-A  AdmitDx: Hematemesis [K92.0]  Partial small bowel obstruction ([facility] [address] [D26.405]  PCP: Erika Sanders MD    Reason for Consult:  Hematemesis, hx crohns    Requesting Physician:  Mckenna Tucker MD      History Obtained From:  Patient and review of all records    HISTORY OF PRESENT ILLNESS:                The patient is a 22 y.o. male with significant past medical history as below who presents with above mentioned causes in reason for consult. [facility]neted to [facility] for sitting up blood. Has hx of Crohns and PUD from NSAID use, Naproxen BID. Usually spits up blood but more recently. Abd pain, lower cramping, radiates to diffuse abd. Diagnostic with Crohns at age 13. Seen GI in [address] [place] [address] Dr. Faye Marion. Has been in group home since age 13. Has been on steroids, levsin, protonix, prozac for Crohns. Nelly have been on Stelara for 3 months. +Nausea and vomiting. Vomites up undigested foods. At least 4 months. Last vomited early this am, brigh red blood noted. Has not eats since admission.      Denies GERD, dyspepsia, dysphagia   Last BM yesterday, bright red blood streaks, formed     Denies History of EGD  History of colonoscopy at age 13      NSAIDs Naproxen BID   Denies Anti-platelet therapy    Non Smoker  Denies Alcohol intake    Past Medical History:        Diagnosis Date    ADHD (attention deficit hyperactivity disorder)     Conduct disorder        Past Surgical History:        Procedure Laterality Date    SHOULDER ARTHROSCOPY Right 2015    TEAR DUCT SURGERY      flushed         Current Medications:    Medications    Scheduled Medications:    sodium chloride flush  10 mL Intravenous 2 times per day    pantoprazole  40 mg Intravenous BID    distress, appears stated age   [de-identified]:    Normocephalic, atraumatic, Conjunctiva clear   Neck:   Supple, symmetrical, trachea midline   Lungs:     Clear to auscultation bilaterally, respirations unlabored   Chest Wall:    No tenderness or deformity    Heart:    Regular rate and rhythm, S1 and S2 normal   Abdomen:     Soft, tender, bowel sounds active all four quadrants,     no masses, no organomegaly, no ascites    Rectal:    Deferred   Extremities:   Extremities normal, atraumatic, no cyanosis or edema   Pulses:   2+ and symmetric all extremities   Skin:   Skin color, texture, turgor normal, no rashes or lesions       Neurologic:   No focal deficits, moving all four extremities            DATA:    ABGs: No results found for: PHART, PO2ART, OFE6ITK  CBC:   Recent Labs     01/01/20  1020 01/02/20  0413   WBC 8.3 9.1   HGB 15.9 15.5    191     BMP:    Recent Labs     01/01/20  1020 01/02/20  0413    139   K 3.6 5.0   CL 99 101   CO2 26 25   BUN 10 10   CREATININE 0.9 0.9   GLUCOSE 88 122*     Magnesium: No results found for: MG  Hepatic:   Recent Labs     01/01/20  1020   AST 15   ALT 11   BILITOT 0.3   ALKPHOS 67     Recent Labs     01/01/20  1020   LIPASE 17     Recent Labs     01/01/20  1020   PROTIME 11.4*   INR 0.94     No results for input(s): PTT in the last 72 hours. Lipids: No results for input(s): CHOL, HDL in the last 72 hours.     Invalid input(s): LDLCALCU  INR:   Recent Labs     01/01/20  1020   INR 0.94     TSH:   Lab Results   Component Value Date    TSH 6.22 07/14/2015     No intake or output data in the 24 hours ending 01/02/20 0800   sodium chloride 100 mL/hr at 01/02/20 0457       Imaging Studies: Reviewed    Patient Active Problem List   Diagnosis Code    Shoulder dislocation S43.006A    SLAP tear of shoulder S43.439A    Hematemesis K92.0     Assessment:  Hematemesis, hx crohns  CT 1/1/2020  There are some prominent air and fluid-filled loops of small bowel noted in   the mid

## 2020-01-03 LAB
HCT VFR BLD CALC: 43.5 % (ref 42–52)
HEMOGLOBIN: 14.4 GM/DL (ref 13.5–18)

## 2020-01-03 PROCEDURE — 2500000003 HC RX 250 WO HCPCS: Performed by: NURSE PRACTITIONER

## 2020-01-03 PROCEDURE — 6370000000 HC RX 637 (ALT 250 FOR IP): Performed by: NURSE PRACTITIONER

## 2020-01-03 PROCEDURE — 96376 TX/PRO/DX INJ SAME DRUG ADON: CPT

## 2020-01-03 PROCEDURE — 94761 N-INVAS EAR/PLS OXIMETRY MLT: CPT

## 2020-01-03 PROCEDURE — 36415 COLL VENOUS BLD VENIPUNCTURE: CPT

## 2020-01-03 PROCEDURE — 2580000003 HC RX 258: Performed by: NURSE PRACTITIONER

## 2020-01-03 PROCEDURE — G0378 HOSPITAL OBSERVATION PER HR: HCPCS

## 2020-01-03 PROCEDURE — 85018 HEMOGLOBIN: CPT

## 2020-01-03 PROCEDURE — 6360000002 HC RX W HCPCS: Performed by: NURSE PRACTITIONER

## 2020-01-03 PROCEDURE — C9113 INJ PANTOPRAZOLE SODIUM, VIA: HCPCS | Performed by: NURSE PRACTITIONER

## 2020-01-03 PROCEDURE — 96366 THER/PROPH/DIAG IV INF ADDON: CPT

## 2020-01-03 PROCEDURE — 85014 HEMATOCRIT: CPT

## 2020-01-03 RX ORDER — PANTOPRAZOLE SODIUM 40 MG/1
40 TABLET, DELAYED RELEASE ORAL
Qty: 30 TABLET | Refills: 1 | Status: SHIPPED | OUTPATIENT
Start: 2020-01-03 | End: 2020-06-30

## 2020-01-03 RX ORDER — ONDANSETRON 4 MG/1
4 TABLET, ORALLY DISINTEGRATING ORAL EVERY 6 HOURS
Qty: 10 TABLET | Refills: 0 | Status: SHIPPED | OUTPATIENT
Start: 2020-01-03 | End: 2020-06-30

## 2020-01-03 RX ADMIN — MORPHINE SULFATE 4 MG: 4 INJECTION, SOLUTION INTRAMUSCULAR; INTRAVENOUS at 10:09

## 2020-01-03 RX ADMIN — METRONIDAZOLE 500 MG: 500 INJECTION, SOLUTION INTRAVENOUS at 01:00

## 2020-01-03 RX ADMIN — SODIUM CHLORIDE, PRESERVATIVE FREE 10 ML: 5 INJECTION INTRAVENOUS at 09:46

## 2020-01-03 RX ADMIN — ONDANSETRON 4 MG: 2 INJECTION INTRAMUSCULAR; INTRAVENOUS at 10:09

## 2020-01-03 RX ADMIN — METRONIDAZOLE 500 MG: 500 INJECTION, SOLUTION INTRAVENOUS at 09:45

## 2020-01-03 RX ADMIN — CIPROFLOXACIN 400 MG: 2 INJECTION, SOLUTION INTRAVENOUS at 06:16

## 2020-01-03 RX ADMIN — PANTOPRAZOLE SODIUM 40 MG: 40 INJECTION, POWDER, FOR SOLUTION INTRAVENOUS at 09:46

## 2020-01-03 RX ADMIN — MORPHINE SULFATE 4 MG: 4 INJECTION, SOLUTION INTRAMUSCULAR; INTRAVENOUS at 05:21

## 2020-01-03 RX ADMIN — HYOSCYAMINE SULFATE 125 MCG: 0.12 TABLET, ORALLY DISINTEGRATING ORAL at 10:09

## 2020-01-03 RX ADMIN — MORPHINE SULFATE 4 MG: 4 INJECTION, SOLUTION INTRAMUSCULAR; INTRAVENOUS at 01:06

## 2020-01-03 ASSESSMENT — PAIN SCALES - GENERAL
PAINLEVEL_OUTOF10: 0
PAINLEVEL_OUTOF10: 0
PAINLEVEL_OUTOF10: 7
PAINLEVEL_OUTOF10: 6
PAINLEVEL_OUTOF10: 6
PAINLEVEL_OUTOF10: 2
PAINLEVEL_OUTOF10: 0
PAINLEVEL_OUTOF10: 7

## 2020-01-03 ASSESSMENT — PAIN SCALES - WONG BAKER
WONGBAKER_NUMERICALRESPONSE: 0

## 2020-01-03 NOTE — DISCHARGE SUMMARY
Mil Molina 2000 [id number]  PCP:  Antoni Gomez MD    Admit date: 1/1/2020  Admitting Physician: Sharifa Downey MD    Discharge date: 1/3/2020 Discharge Physician: Susie Urbina MD         Discharge Diagnoses. As per below    Hospital Course:  History of present illness at admission: As per H&P  Subsequent Hospital Course:     Hematemesis is a bleed from Lucía-Rodriguez tear. History of Crohn's not in flareup. DC antibiotics    On the day of discharge, pt felt better. No new complaints.     Pertinent Exam Findings on Day of Discharge:  General Appearance:    Alert, cooperative, no distress, appears stated age  Head:    Normocephalic, without obvious abnormality, atraumatic  Eyes:    PERRL, conjunctiva/corneas clear, EOM's intact  Lungs:    Clear to auscultation bilaterally, respirations unlabored   Heart:    Regular rate and rhythm, S1 and S2 normal, no murmur,   rub or gallop  Abdomen:     Soft, non-tender, bowel sounds active, no masses, no organomegaly  Extremities:   Extremities normal, atraumatic, no cyanosis or edema    Consults:  IP CONSULT TO HOSPITALIST  IP CONSULT TO GI    Patient Instructions:   Babita Hahn   Home Medication Instructions LMB:[id number]    Printed on:01/03/20 3960   Medication Information                      hyoscyamine (LEVSIN/SL) 125 MCG sublingual tablet  Place 1 tablet under the tongue every 4 hours as needed for Cramping             ondansetron (ZOFRAN ODT) 4 MG disintegrating tablet  Take 1 tablet by mouth every 6 hours             pantoprazole (PROTONIX) 40 MG tablet  Take 1 tablet by mouth every morning (before breakfast)                   Diet:  General diet    Activity:   activity as tolerated     Discharge Condition:   good    Disposition:  Correctional facility    Follow-up    Antoni Gomez MD  Schedule an appointment as soon as possible for a visit  Medical Management  [address] [facility] [address]

## 2020-01-03 NOTE — PROGRESS NOTES
Patient is currently an inmate in a correctional facility. Shift assessment done. Patients right leg is hand cuffed to bed. No redness noted to area. Officer at bedside. Patient denies any problems or concerns except for some abdominal discomfort.

## 2020-01-07 ENCOUNTER — HOSPITAL ENCOUNTER (EMERGENCY)
Age: 20
Discharge: HOME | End: 2020-01-07
Attending: EMERGENCY MEDICINE
Payer: COMMERCIAL

## 2020-01-07 ENCOUNTER — APPOINTMENT (OUTPATIENT)
Dept: CT IMAGING | Age: 20
End: 2020-01-07
Payer: COMMERCIAL

## 2020-01-07 VITALS
HEIGHT: 68 IN | TEMPERATURE: 98.4 F | RESPIRATION RATE: 17 BRPM | WEIGHT: 140 LBS | HEART RATE: 70 BPM | SYSTOLIC BLOOD PRESSURE: 122 MMHG | OXYGEN SATURATION: 96 % | DIASTOLIC BLOOD PRESSURE: 80 MMHG | BODY MASS INDEX: 21.22 KG/M2

## 2020-01-07 LAB
ALBUMIN SERPL-MCNC: 4.5 GM/DL (ref 3.4–5)
ALP BLD-CCNC: 57 IU/L (ref 40–129)
ALT SERPL-CCNC: 10 U/L (ref 10–40)
ANION GAP SERPL CALCULATED.3IONS-SCNC: 13 MMOL/L (ref 4–16)
AST SERPL-CCNC: 14 IU/L (ref 15–37)
BASOPHILS ABSOLUTE: 0 K/CU MM
BASOPHILS RELATIVE PERCENT: 0.5 % (ref 0–1)
BILIRUB SERPL-MCNC: 0.3 MG/DL (ref 0–1)
BUN BLDV-MCNC: 12 MG/DL (ref 6–23)
CALCIUM SERPL-MCNC: 9.4 MG/DL (ref 8.3–10.6)
CHLORIDE BLD-SCNC: 101 MMOL/L (ref 99–110)
CO2: 25 MMOL/L (ref 21–32)
CREAT SERPL-MCNC: 0.8 MG/DL (ref 0.9–1.3)
DIFFERENTIAL TYPE: ABNORMAL
EOSINOPHILS ABSOLUTE: 0.1 K/CU MM
EOSINOPHILS RELATIVE PERCENT: 1.8 % (ref 0–3)
GFR AFRICAN AMERICAN: >60 ML/MIN/1.73M2
GFR NON-AFRICAN AMERICAN: >60 ML/MIN/1.73M2
GLUCOSE BLD-MCNC: 89 MG/DL (ref 70–99)
HCT VFR BLD CALC: 51.8 % (ref 42–52)
HEMOGLOBIN: 16.8 GM/DL (ref 13.5–18)
IMMATURE NEUTROPHIL %: 0.5 % (ref 0–0.43)
LIPASE: 16 IU/L (ref 13–60)
LYMPHOCYTES ABSOLUTE: 2.5 K/CU MM
LYMPHOCYTES RELATIVE PERCENT: 38.3 % (ref 24–44)
MCH RBC QN AUTO: 30.2 PG (ref 27–31)
MCHC RBC AUTO-ENTMCNC: 32.4 % (ref 32–36)
MCV RBC AUTO: 93 FL (ref 78–100)
MONOCYTES ABSOLUTE: 0.4 K/CU MM
MONOCYTES RELATIVE PERCENT: 6.6 % (ref 0–4)
NUCLEATED RBC %: 0 %
PDW BLD-RTO: 11.9 % (ref 11.7–14.9)
PLATELET # BLD: 192 K/CU MM (ref 140–440)
PMV BLD AUTO: 9.7 FL (ref 7.5–11.1)
POTASSIUM SERPL-SCNC: 4.2 MMOL/L (ref 3.5–5.1)
RBC # BLD: 5.57 M/CU MM (ref 4.6–6.2)
SEGMENTED NEUTROPHILS ABSOLUTE COUNT: 3.4 K/CU MM
SEGMENTED NEUTROPHILS RELATIVE PERCENT: 52.3 % (ref 36–66)
SODIUM BLD-SCNC: 139 MMOL/L (ref 135–145)
TOTAL IMMATURE NEUTOROPHIL: 0.03 K/CU MM
TOTAL NUCLEATED RBC: 0 K/CU MM
TOTAL PROTEIN: 7.4 GM/DL (ref 6.4–8.2)
WBC # BLD: 6.5 K/CU MM (ref 4–10.5)

## 2020-01-07 PROCEDURE — 2500000003 HC RX 250 WO HCPCS: Performed by: EMERGENCY MEDICINE

## 2020-01-07 PROCEDURE — 96374 THER/PROPH/DIAG INJ IV PUSH: CPT

## 2020-01-07 PROCEDURE — 99284 EMERGENCY DEPT VISIT MOD MDM: CPT

## 2020-01-07 PROCEDURE — C9113 INJ PANTOPRAZOLE SODIUM, VIA: HCPCS | Performed by: EMERGENCY MEDICINE

## 2020-01-07 PROCEDURE — 6360000004 HC RX CONTRAST MEDICATION: Performed by: EMERGENCY MEDICINE

## 2020-01-07 PROCEDURE — 96372 THER/PROPH/DIAG INJ SC/IM: CPT

## 2020-01-07 PROCEDURE — 6360000002 HC RX W HCPCS: Performed by: EMERGENCY MEDICINE

## 2020-01-07 PROCEDURE — 85025 COMPLETE CBC W/AUTO DIFF WBC: CPT

## 2020-01-07 PROCEDURE — 96375 TX/PRO/DX INJ NEW DRUG ADDON: CPT

## 2020-01-07 PROCEDURE — 83690 ASSAY OF LIPASE: CPT

## 2020-01-07 PROCEDURE — 96376 TX/PRO/DX INJ SAME DRUG ADON: CPT

## 2020-01-07 PROCEDURE — 74177 CT ABD & PELVIS W/CONTRAST: CPT

## 2020-01-07 PROCEDURE — 2580000003 HC RX 258: Performed by: EMERGENCY MEDICINE

## 2020-01-07 PROCEDURE — 80053 COMPREHEN METABOLIC PANEL: CPT

## 2020-01-07 RX ORDER — SODIUM CHLORIDE 0.9 % (FLUSH) 0.9 %
10 SYRINGE (ML) INJECTION PRN
Status: DISCONTINUED | OUTPATIENT
Start: 2020-01-07 | End: 2020-01-07 | Stop reason: HOSPADM

## 2020-01-07 RX ORDER — DICYCLOMINE HYDROCHLORIDE 10 MG/1
10 CAPSULE ORAL 3 TIMES DAILY
Qty: 15 CAPSULE | Refills: 3 | Status: SHIPPED | OUTPATIENT
Start: 2020-01-07 | End: 2020-06-30

## 2020-01-07 RX ORDER — POLYETHYLENE GLYCOL 3350 17 G/17G
17 POWDER, FOR SOLUTION ORAL DAILY PRN
Qty: 527 G | Refills: 1 | Status: SHIPPED | OUTPATIENT
Start: 2020-01-07 | End: 2020-02-06

## 2020-01-07 RX ORDER — DICYCLOMINE HYDROCHLORIDE 10 MG/ML
20 INJECTION INTRAMUSCULAR ONCE
Status: COMPLETED | OUTPATIENT
Start: 2020-01-07 | End: 2020-01-07

## 2020-01-07 RX ORDER — BISACODYL 10 MG
10 SUPPOSITORY, RECTAL RECTAL DAILY
Qty: 30 SUPPOSITORY | Refills: 0 | Status: SHIPPED | OUTPATIENT
Start: 2020-01-07 | End: 2020-02-06

## 2020-01-07 RX ORDER — ONDANSETRON 4 MG/1
4 TABLET, ORALLY DISINTEGRATING ORAL EVERY 8 HOURS PRN
Qty: 15 TABLET | Refills: 0 | Status: SHIPPED | OUTPATIENT
Start: 2020-01-07 | End: 2020-06-30

## 2020-01-07 RX ORDER — 0.9 % SODIUM CHLORIDE 0.9 %
1000 INTRAVENOUS SOLUTION INTRAVENOUS ONCE
Status: COMPLETED | OUTPATIENT
Start: 2020-01-07 | End: 2020-01-07

## 2020-01-07 RX ORDER — PANTOPRAZOLE SODIUM 40 MG/10ML
40 INJECTION, POWDER, LYOPHILIZED, FOR SOLUTION INTRAVENOUS ONCE
Status: COMPLETED | OUTPATIENT
Start: 2020-01-07 | End: 2020-01-07

## 2020-01-07 RX ORDER — PANTOPRAZOLE SODIUM 20 MG/1
20 TABLET, DELAYED RELEASE ORAL DAILY
Qty: 30 TABLET | Refills: 0 | Status: SHIPPED | OUTPATIENT
Start: 2020-01-07 | End: 2020-06-30

## 2020-01-07 RX ORDER — MORPHINE SULFATE 4 MG/ML
4 INJECTION, SOLUTION INTRAMUSCULAR; INTRAVENOUS EVERY 30 MIN PRN
Status: DISCONTINUED | OUTPATIENT
Start: 2020-01-07 | End: 2020-01-07 | Stop reason: HOSPADM

## 2020-01-07 RX ORDER — ACETAMINOPHEN 325 MG/1
650 TABLET ORAL EVERY 6 HOURS PRN
Qty: 120 TABLET | Refills: 3 | Status: SHIPPED | OUTPATIENT
Start: 2020-01-07 | End: 2020-06-30

## 2020-01-07 RX ORDER — FAMOTIDINE 20 MG/1
20 TABLET, FILM COATED ORAL 2 TIMES DAILY
Qty: 14 TABLET | Refills: 0 | Status: SHIPPED | OUTPATIENT
Start: 2020-01-07 | End: 2020-06-30

## 2020-01-07 RX ORDER — ONDANSETRON 2 MG/ML
4 INJECTION INTRAMUSCULAR; INTRAVENOUS EVERY 30 MIN PRN
Status: DISCONTINUED | OUTPATIENT
Start: 2020-01-07 | End: 2020-01-07 | Stop reason: HOSPADM

## 2020-01-07 RX ADMIN — MORPHINE SULFATE 4 MG: 4 INJECTION, SOLUTION INTRAMUSCULAR; INTRAVENOUS at 13:50

## 2020-01-07 RX ADMIN — IOPAMIDOL 75 ML: 755 INJECTION, SOLUTION INTRAVENOUS at 14:36

## 2020-01-07 RX ADMIN — SODIUM CHLORIDE 1000 ML: 9 INJECTION, SOLUTION INTRAVENOUS at 13:25

## 2020-01-07 RX ADMIN — PANTOPRAZOLE SODIUM 40 MG: 40 INJECTION, POWDER, FOR SOLUTION INTRAVENOUS at 13:25

## 2020-01-07 RX ADMIN — ONDANSETRON 4 MG: 2 INJECTION INTRAMUSCULAR; INTRAVENOUS at 13:24

## 2020-01-07 RX ADMIN — FAMOTIDINE 20 MG: 10 INJECTION, SOLUTION INTRAVENOUS at 13:24

## 2020-01-07 RX ADMIN — Medication 10 ML: at 14:37

## 2020-01-07 RX ADMIN — ONDANSETRON 4 MG: 2 INJECTION INTRAMUSCULAR; INTRAVENOUS at 15:22

## 2020-01-07 RX ADMIN — DICYCLOMINE HYDROCHLORIDE 20 MG: 20 INJECTION INTRAMUSCULAR at 13:49

## 2020-01-07 ASSESSMENT — PAIN SCALES - GENERAL
PAINLEVEL_OUTOF10: 8
PAINLEVEL_OUTOF10: 8

## 2020-01-07 ASSESSMENT — ENCOUNTER SYMPTOMS
ALLERGIC/IMMUNOLOGIC NEGATIVE: 1
NAUSEA: 1
RESPIRATORY NEGATIVE: 1
EYES NEGATIVE: 1
VOMITING: 1
ABDOMINAL PAIN: 1

## 2020-01-07 ASSESSMENT — PAIN DESCRIPTION - LOCATION: LOCATION: ABDOMEN

## 2020-01-07 ASSESSMENT — PAIN DESCRIPTION - PAIN TYPE: TYPE: ACUTE PAIN

## 2020-01-07 NOTE — CARE COORDINATION
CM review of pt chart for readmission risk, last admission 1/1-3/20 for c/o Hematemesis. Discharged back to [facility]. Pt has returned from longterm with same complaint. Spoke with Dr Britton Pineda who anticipates pt will have to be readmitted due to GI bleed, results pending at this time.  LULURN/CM

## 2020-01-07 NOTE — ED NOTES
Dr. Brenda Yoon said ok for eating.  Pt provided with lunch box and soda per request.     Sanjay Schultz RN  01/07/20 [id number]

## 2020-01-07 NOTE — ED TRIAGE NOTES
Pt brought from the assisted by [employer]; pt states that he is having abdominal pain and that he is vomiting blood; pt has a history of stomach ulcers; pt states that 5 days ago he was admitted to the hospital and found that he had a bowel obstruction; pt was discharged within the past couple of days back to the assisted; pt states that since being admitted and discharged he has not had a bowel movement; deputy remaining with pt while here in the ER

## 2020-01-07 NOTE — ED PROVIDER NOTES
[facility]      TRIAGE CHIEF COMPLAINT:   Abdominal Pain and Hematemesis      [place]:  Belgica Lazcano is a 22 y.o. male that presents with a complaint of abdominal pain nausea vomiting hematemesis. Patient here recently for similar complaint diagnosed with a Lucía-Rodriguez tear. He has Crohn's disease. Patient is currently incarcerated. He denies any fevers chest pain shortness of breath bowel or bladder symptoms otherwise. No other questions or concerns. He has been taking his medication. REVIEW OF SYSTEMS:  At least 10 systems reviewed and otherwise acutely negative except as in the [address] Review of Systems   Constitutional: Negative. HENT: Negative. Eyes: Negative. Respiratory: Negative. Cardiovascular: Negative. Gastrointestinal: Positive for abdominal pain, nausea and vomiting. Endocrine: Negative. Genitourinary: Negative. Musculoskeletal: Negative. Skin: Negative. Allergic/Immunologic: Negative. Neurological: Negative. Hematological: Negative. Psychiatric/Behavioral: Negative. All other systems reviewed and are negative.       Past Medical History:   Diagnosis Date    ADHD (attention deficit hyperactivity disorder)     Conduct disorder      Past Surgical History:   Procedure Laterality Date    SHOULDER ARTHROSCOPY Right 6-    TEAR DUCT SURGERY      flushed     Family History   Adopted: Yes     Social History     Socioeconomic History    Marital status: Single     Spouse name: Not on file    Number of children: Not on file    Years of education: Not on file    Highest education level: Not on file   Occupational History    Not on file   Social Needs    Financial resource strain: Not on file    Food insecurity:     Worry: Not on file     Inability: Not on file    Transportation needs:     Medical: Not on file     Non-medical: Not on file   Tobacco Use    Smoking status: Current Every Day Smoker     Packs/day: 2.00 polyethylene glycol (MIRALAX) packet Take 17 g by mouth daily as needed for Other (Constipation) 527 g 1    bisacodyl (DULCOLAX) 10 MG suppository Place 1 suppository rectally daily 30 suppository 0    ondansetron (ZOFRAN ODT) 4 MG disintegrating tablet Take 1 tablet by mouth every 6 hours 10 tablet 0    pantoprazole (PROTONIX) 40 MG tablet Take 1 tablet by mouth every morning (before breakfast) 30 tablet 1    hyoscyamine (LEVSIN/SL) 125 MCG sublingual tablet Place 1 tablet under the tongue every 4 hours as needed for Cramping 15 tablet 3      No Known Allergies  Current Facility-Administered Medications   Medication Dose Route Frequency Provider Last Rate Last Dose    ondansetron (ZOFRAN) injection 4 mg  4 mg Intravenous Q30 Min PRN Urszula Gibson, DO   4 mg at 01/07/20 1522    morphine sulfate (PF) injection 4 mg  4 mg Intravenous Q30 Min PRN Urszula Gibson, DO   4 mg at 01/07/20 1350    sodium chloride flush 0.9 % injection 10 mL  10 mL Intravenous PRN Tong Perry DO   10 mL at 01/07/20 1437     Current Outpatient Medications   Medication Sig Dispense Refill    ondansetron (ZOFRAN ODT) 4 MG disintegrating tablet Take 1 tablet by mouth every 8 hours as needed for Nausea 15 tablet 0    famotidine (PEPCID) 20 MG tablet Take 1 tablet by mouth 2 times daily 14 tablet 0    pantoprazole (PROTONIX) 20 MG tablet Take 1 tablet by mouth daily 30 tablet 0    dicyclomine (BENTYL) 10 MG capsule Take 1 capsule by mouth 3 times daily As needed for abdominal pain 15 capsule 3    acetaminophen (TYLENOL) 325 MG tablet Take 2 tablets by mouth every 6 hours as needed for Pain 120 tablet 3    polyethylene glycol (MIRALAX) packet Take 17 g by mouth daily as needed for Other (Constipation) 527 g 1    bisacodyl (DULCOLAX) 10 MG suppository Place 1 suppository rectally daily 30 suppository 0    ondansetron (ZOFRAN ODT) 4 MG disintegrating tablet Take 1 tablet by mouth every 6 hours 10 tablet 0    pantoprazole (PROTONIX) 40 MG tablet Take 1 tablet by mouth every morning (before breakfast) 30 tablet 1    hyoscyamine (LEVSIN/SL) 125 MCG sublingual tablet Place 1 tablet under the tongue every 4 hours as needed for Cramping 15 tablet 3       Nursing Notes Reviewed    VITAL SIGNS:  ED Triage Vitals [01/07/20 1250]   Enc Vitals Group      /85      Pulse 87      Resp 20      Temp 98.4 °F (36.9 °C)      Temp Source Oral      SpO2 94 %      Weight 140 lb (63.5 kg)      Height 5' 8\" (1.727 m)      Head Circumference       Peak Flow       Pain Score       Pain Loc       Pain Edu? Excl. in [address]? PHYSICAL EXAM:  Physical Exam  Vitals signs and nursing note reviewed. Constitutional:       General: He is not in acute distress. Appearance: Normal appearance. He is well-developed and well-groomed. He is not ill-appearing, toxic-appearing or diaphoretic. HENT:      Head: Normocephalic and atraumatic. Right Ear: External ear normal.      Left Ear: External ear normal.      Nose: Nose normal. No congestion or rhinorrhea. Mouth/Throat:      Mouth: Mucous membranes are moist.      Pharynx: No oropharyngeal exudate or posterior oropharyngeal erythema. Eyes:      General: No scleral icterus. Right eye: No discharge. Left eye: No discharge. Extraocular Movements: Extraocular movements intact. Conjunctiva/sclera: Conjunctivae normal.      Pupils: Pupils are equal, round, and reactive to light. Neck:      Musculoskeletal: Full passive range of motion without pain and normal range of motion. Normal range of motion. No edema, erythema, neck rigidity, crepitus, injury or pain with movement. Vascular: No JVD. Trachea: Phonation normal.   Cardiovascular:      Rate and Rhythm: Normal rate and regular rhythm. Pulses: Normal pulses. Heart sounds: Normal heart sounds. No murmur. No friction rub. No gallop.     Pulmonary:      Effort: Pulmonary effort is normal. No 11.7 - 14.9 %    Platelets 860 735 - 662 K/CU MM    MPV 9.7 7.5 - 11.1 FL    Differential Type AUTOMATED DIFFERENTIAL     Segs Relative 52.3 36 - 66 %    Lymphocytes % 38.3 24 - 44 %    Monocytes % 6.6 (H) 0 - 4 %    Eosinophils % 1.8 0 - 3 %    Basophils % 0.5 0 - 1 %    Segs Absolute 3.4 K/CU MM    Lymphocytes Absolute 2.5 K/CU MM    Monocytes Absolute 0.4 K/CU MM    Eosinophils Absolute 0.1 K/CU MM    Basophils Absolute 0.0 K/CU MM    Nucleated RBC % 0.0 %    Total Nucleated RBC 0.0 K/CU MM    Total Immature Neutrophil 0.03 K/CU MM    Immature Neutrophil % 0.5 (H) 0 - 0.43 %   CMP   Result Value Ref Range    Sodium 139 135 - 145 MMOL/L    Potassium 4.2 3.5 - 5.1 MMOL/L    Chloride 101 99 - 110 mMol/L    CO2 25 21 - 32 MMOL/L    BUN 12 6 - 23 MG/DL    CREATININE 0.8 (L) 0.9 - 1.3 MG/DL    Glucose 89 70 - 99 MG/DL    Calcium 9.4 8.3 - 10.6 MG/DL    Alb 4.5 3.4 - 5.0 GM/DL    Total Protein 7.4 6.4 - 8.2 GM/DL    Total Bilirubin 0.3 0.0 - 1.0 MG/DL    ALT 10 10 - 40 U/L    AST 14 (L) 15 - 37 IU/L    Alkaline Phosphatase 57 40 - 129 IU/L    GFR Non-African American >60 >60 mL/min/1.73m2    GFR African American >60 >60 mL/min/1.73m2    Anion Gap 13 4 - 16   Lipase   Result Value Ref Range    Lipase 16 13 - 60 IU/L        Radiographs (if obtained):  [] The following radiograph was interpreted by myself in the absence of a radiologist:  [x] Radiologist's Report Reviewed:    CT Abd/pelv    Xr Abdomen (kub) (single Ap View)    Result Date: 12/17/2019  EXAMINATION: ONE SUPINE XRAY VIEW(S) OF THE ABDOMEN 12/17/2019 1:57 am COMPARISON: None. HISTORY: ORDERING SYSTEM PROVIDED HISTORY: swallowed fb TECHNOLOGIST PROVIDED HISTORY: Abd KUB Reason for exam:->swallowed fb Reason for Exam: swallowed fb Acuity: Acute Type of Exam: Initial FINDINGS: There is a mild to moderate stool load. Bowel gas pattern is otherwise unremarkable. No evidence of a radiopaque foreign body in the abdomen or pelvis. Lung bases are clear.   No acute bone finding. Mild to moderate stool load suggests constipation. No evidence of a radiopaque foreign body. Ct Abdomen Pelvis W Iv Contrast    Result Date: 1/1/2020  EXAMINATION: CT OF THE ABDOMEN AND PELVIS WITH CONTRAST 1/1/2020 11:49 am TECHNIQUE: CT of the abdomen and pelvis was performed with the administration of intravenous contrast. Multiplanar reformatted images are provided for review. Dose modulation, iterative reconstruction, and/or weight based adjustment of the mA/kV was utilized to reduce the radiation dose to as low as reasonably achievable. COMPARISON: KUB 12/17/2019. Right upper quadrant ultrasound 09/02/2019. HISTORY: ORDERING SYSTEM PROVIDED HISTORY: abdominal pain, hematemesis, history of crohns TECHNOLOGIST PROVIDED HISTORY: IV contrast only. Thank you. Reason for exam:->abdominal pain, hematemesis, history of crohns Reason for exam:->IV contrast only. Thank you. Reason for Exam: abdominal pain, hematemesis, history of crohns Acuity: Acute Type of Exam: Initial Additional signs and symptoms: 100 mLs total (IV hub broke off during first injection around 30-35 mL's), >60, 20g FINDINGS: Lower Chest: No focal consolidations or pleural effusions. Organs: Liver, spleen, pancreas, gallbladder, adrenal glands and kidneys are unremarkable. GI/Bowel: There are some prominent air and fluid-filled loops of small bowel noted in the mid abdomen measuring up to 3.6 cm in caliber without definite wall thickening identified. No discrete transition point is identified. Prominent stool throughout large bowel. Air present in the rectum. Appendix not definitely seen, but no findings for acute appendicitis identified. Pelvis: Urinary bladder and prostate appear unremarkable. Peritoneum/Retroperitoneum: No ascites or focal fluid collections. No intraperitoneal free air. Abdominal aorta is normal in caliber. No lymphadenopathy is seen.  Bones/Soft Tissues: No acute or suspicious bone or soft tissue

## 2020-06-14 ENCOUNTER — APPOINTMENT (OUTPATIENT)
Dept: CT IMAGING | Age: 20
End: 2020-06-14
Payer: COMMERCIAL

## 2020-06-14 ENCOUNTER — HOSPITAL ENCOUNTER (EMERGENCY)
Age: 20
Discharge: LEFT BEFORE DISCHARGE | End: 2020-06-14
Payer: COMMERCIAL

## 2020-06-14 VITALS
DIASTOLIC BLOOD PRESSURE: 107 MMHG | SYSTOLIC BLOOD PRESSURE: 151 MMHG | HEIGHT: 68 IN | BODY MASS INDEX: 24.25 KG/M2 | TEMPERATURE: 97.2 F | OXYGEN SATURATION: 98 % | WEIGHT: 160 LBS | HEART RATE: 61 BPM | RESPIRATION RATE: 18 BRPM

## 2020-06-14 PROCEDURE — 99284 EMERGENCY DEPT VISIT MOD MDM: CPT

## 2020-06-14 PROCEDURE — 72125 CT NECK SPINE W/O DYE: CPT

## 2020-06-14 PROCEDURE — 70450 CT HEAD/BRAIN W/O DYE: CPT

## 2020-06-14 PROCEDURE — 70486 CT MAXILLOFACIAL W/O DYE: CPT

## 2020-06-14 PROCEDURE — 6370000000 HC RX 637 (ALT 250 FOR IP): Performed by: PHYSICIAN ASSISTANT

## 2020-06-14 RX ORDER — HYDROCODONE BITARTRATE AND ACETAMINOPHEN 5; 325 MG/1; MG/1
1 TABLET ORAL ONCE
Status: COMPLETED | OUTPATIENT
Start: 2020-06-14 | End: 2020-06-14

## 2020-06-14 RX ADMIN — HYDROCODONE BITARTRATE AND ACETAMINOPHEN 1 TABLET: 5; 325 TABLET ORAL at 14:50

## 2020-06-14 ASSESSMENT — PAIN SCALES - GENERAL
PAINLEVEL_OUTOF10: 9
PAINLEVEL_OUTOF10: 9

## 2020-06-14 ASSESSMENT — PAIN DESCRIPTION - PAIN TYPE: TYPE: ACUTE PAIN

## 2020-06-14 ASSESSMENT — PAIN DESCRIPTION - LOCATION: LOCATION: EYE

## 2020-06-14 ASSESSMENT — PAIN DESCRIPTION - ORIENTATION: ORIENTATION: LEFT

## 2020-06-14 NOTE — ED PROVIDER NOTES
abused: None     Forced sexual activity: None   Other Topics Concern    None   Social History Narrative    None     Family History   Adopted: Yes       PHYSICAL EXAM    VITAL SIGNS: BP (!) 151/107   Pulse 61   Temp 97.2 °F (36.2 °C) (Oral)   Resp 18   Ht 5' 8\" (1.727 m)   Wt 160 lb (72.6 kg)   SpO2 98%   BMI 24.33 kg/m²    Constitutional:  Well developed, well nourished, no acute distress   Scalp:  No swelling, discoloration. Skin intact  Neck:   No JVD    No swelling or discoloration on inspection. + upper posterior midline neck tenderness. Full range of motion. Eyes:   Left-sided periorbital swelling and bruising. Able to see full pupil when looking at patient. PERRL. EOMI (no evidence of muscle entrapment) Funduscopic exam reveals no obvious retinal hemorrhages, defects. HENT:   Left-sided periorbital swelling and bruising. There is superficial laceration to the left lower lip without active bleeding. No trismus, airway patent. EACs and TMs clear. Nares patent bilaterally without clear fluid or blood. No mass or evidence of septal hematoma. Oropharynx clear, dentition intact   No Forbes sign,  no raccoon sign  Tenderness palpation of left maxilla and mandible and left periorbital region. Respiratory:  Lungs Clear, no retractions   Cardiovascular:  Reg rate, no murmurs  GI:  Soft, nontender, normal bowel sounds  Musculoskeletal:  No edema, no acute deformities  Integument:  Well hydrated, no petechiae   Neurologic:    - Alert & oriented person, place, time, and situation, no speech difficulties or slurring.  - No obvious gross motor deficits  - Cranial nerves 2-12 grossly intact  - Sensation intact to light touch  - Strength 5/5 in upper and lower extremities bilaterally  - Normal finger to nose test bilaterally  - Rapid alternating movements intact  - Normal heel-shin bilaterally  - No pronator drift. - Light touch sensation intact throughout.   - Upper and lower extremity DTRs 2+

## 2020-06-30 ENCOUNTER — HOSPITAL ENCOUNTER (EMERGENCY)
Age: 20
Discharge: OTHER INSTITUTION | End: 2020-07-01
Attending: EMERGENCY MEDICINE
Payer: COMMERCIAL

## 2020-06-30 ENCOUNTER — HOSPITAL ENCOUNTER (EMERGENCY)
Age: 20
Discharge: LEFT BEFORE DISCHARGE | End: 2020-06-30
Attending: EMERGENCY MEDICINE
Payer: COMMERCIAL

## 2020-06-30 LAB
ACETAMINOPHEN LEVEL: <5 UG/ML (ref 15–30)
ALBUMIN SERPL-MCNC: 4.4 GM/DL (ref 3.4–5)
ALCOHOL SCREEN SERUM: <0.01 %WT/VOL
ALP BLD-CCNC: 61 IU/L (ref 40–129)
ALT SERPL-CCNC: 16 U/L (ref 10–40)
AMPHETAMINES: ABNORMAL
ANION GAP SERPL CALCULATED.3IONS-SCNC: 10 MMOL/L (ref 4–16)
AST SERPL-CCNC: 28 IU/L (ref 15–37)
BACTERIA: NEGATIVE /HPF
BARBITURATE SCREEN URINE: NEGATIVE
BASOPHILS ABSOLUTE: 0 K/CU MM
BASOPHILS RELATIVE PERCENT: 0.4 % (ref 0–1)
BENZODIAZEPINE SCREEN, URINE: NEGATIVE
BILIRUB SERPL-MCNC: 0.6 MG/DL (ref 0–1)
BILIRUBIN URINE: NEGATIVE MG/DL
BLOOD, URINE: NEGATIVE
BUN BLDV-MCNC: 14 MG/DL (ref 6–23)
CALCIUM OXALATE CRYSTALS: ABNORMAL /HPF
CALCIUM SERPL-MCNC: 9.3 MG/DL (ref 8.3–10.6)
CANNABINOID SCREEN URINE: ABNORMAL
CHLORIDE BLD-SCNC: 101 MMOL/L (ref 99–110)
CLARITY: CLEAR
CO2: 25 MMOL/L (ref 21–32)
COCAINE METABOLITE: NEGATIVE
COLOR: YELLOW
CREAT SERPL-MCNC: 1 MG/DL (ref 0.9–1.3)
DIFFERENTIAL TYPE: ABNORMAL
DOSE AMOUNT: ABNORMAL
DOSE AMOUNT: ABNORMAL
DOSE TIME: ABNORMAL
DOSE TIME: ABNORMAL
EOSINOPHILS ABSOLUTE: 0.2 K/CU MM
EOSINOPHILS RELATIVE PERCENT: 2.4 % (ref 0–3)
GFR AFRICAN AMERICAN: >60 ML/MIN/1.73M2
GFR NON-AFRICAN AMERICAN: >60 ML/MIN/1.73M2
GLUCOSE BLD-MCNC: 81 MG/DL (ref 70–99)
GLUCOSE, URINE: NEGATIVE MG/DL
HCT VFR BLD CALC: 44.8 % (ref 42–52)
HEMOGLOBIN: 14.8 GM/DL (ref 13.5–18)
HYALINE CASTS: 10 /LPF
IMMATURE NEUTROPHIL %: 0.6 % (ref 0–0.43)
KETONES, URINE: NEGATIVE MG/DL
LEUKOCYTE ESTERASE, URINE: NEGATIVE
LYMPHOCYTES ABSOLUTE: 1.9 K/CU MM
LYMPHOCYTES RELATIVE PERCENT: 26.3 % (ref 24–44)
MCH RBC QN AUTO: 30.4 PG (ref 27–31)
MCHC RBC AUTO-ENTMCNC: 33 % (ref 32–36)
MCV RBC AUTO: 92 FL (ref 78–100)
MONOCYTES ABSOLUTE: 0.7 K/CU MM
MONOCYTES RELATIVE PERCENT: 9.2 % (ref 0–4)
MUCUS: ABNORMAL HPF
NITRITE URINE, QUANTITATIVE: NEGATIVE
NUCLEATED RBC %: 0 %
OPIATES, URINE: NEGATIVE
OXYCODONE: NEGATIVE
PDW BLD-RTO: 12.1 % (ref 11.7–14.9)
PH, URINE: 5 (ref 5–8)
PHENCYCLIDINE, URINE: NEGATIVE
PLATELET # BLD: 165 K/CU MM (ref 140–440)
PMV BLD AUTO: 9.6 FL (ref 7.5–11.1)
POTASSIUM SERPL-SCNC: 3.7 MMOL/L (ref 3.5–5.1)
PROTEIN UA: 30 MG/DL
RBC # BLD: 4.87 M/CU MM (ref 4.6–6.2)
RBC URINE: <1 /HPF (ref 0–3)
SALICYLATE LEVEL: <0.3 MG/DL (ref 15–30)
SEGMENTED NEUTROPHILS ABSOLUTE COUNT: 4.4 K/CU MM
SEGMENTED NEUTROPHILS RELATIVE PERCENT: 61.1 % (ref 36–66)
SODIUM BLD-SCNC: 136 MMOL/L (ref 135–145)
SPECIFIC GRAVITY UA: 1.03 (ref 1–1.03)
TOTAL IMMATURE NEUTOROPHIL: 0.04 K/CU MM
TOTAL NUCLEATED RBC: 0 K/CU MM
TOTAL PROTEIN: 7.1 GM/DL (ref 6.4–8.2)
TRICHOMONAS: ABNORMAL /HPF
UROBILINOGEN, URINE: 1 MG/DL (ref 0.2–1)
WBC # BLD: 7.2 K/CU MM (ref 4–10.5)
WBC UA: 1 /HPF (ref 0–2)

## 2020-06-30 PROCEDURE — G0480 DRUG TEST DEF 1-7 CLASSES: HCPCS

## 2020-06-30 PROCEDURE — 99285 EMERGENCY DEPT VISIT HI MDM: CPT

## 2020-06-30 PROCEDURE — 80053 COMPREHEN METABOLIC PANEL: CPT

## 2020-06-30 PROCEDURE — 36415 COLL VENOUS BLD VENIPUNCTURE: CPT

## 2020-06-30 PROCEDURE — 85025 COMPLETE CBC W/AUTO DIFF WBC: CPT

## 2020-06-30 PROCEDURE — 81001 URINALYSIS AUTO W/SCOPE: CPT

## 2020-06-30 PROCEDURE — 6370000000 HC RX 637 (ALT 250 FOR IP): Performed by: EMERGENCY MEDICINE

## 2020-06-30 PROCEDURE — 80307 DRUG TEST PRSMV CHEM ANLYZR: CPT

## 2020-06-30 RX ORDER — DIPHENHYDRAMINE HYDROCHLORIDE 50 MG/ML
50 INJECTION INTRAMUSCULAR; INTRAVENOUS ONCE
Status: DISCONTINUED | OUTPATIENT
Start: 2020-06-30 | End: 2020-06-30 | Stop reason: HOSPADM

## 2020-06-30 RX ORDER — QUETIAPINE FUMARATE 50 MG/1
50 TABLET, FILM COATED ORAL 2 TIMES DAILY
COMMUNITY
End: 2021-10-05

## 2020-06-30 RX ORDER — LORAZEPAM 1 MG/1
2 TABLET ORAL ONCE
Status: COMPLETED | OUTPATIENT
Start: 2020-06-30 | End: 2020-06-30

## 2020-06-30 RX ORDER — MIDAZOLAM HYDROCHLORIDE 1 MG/ML
2 INJECTION INTRAMUSCULAR; INTRAVENOUS ONCE
Status: DISCONTINUED | OUTPATIENT
Start: 2020-06-30 | End: 2020-06-30 | Stop reason: HOSPADM

## 2020-06-30 RX ORDER — HALOPERIDOL 5 MG/ML
5 INJECTION INTRAMUSCULAR ONCE
Status: DISCONTINUED | OUTPATIENT
Start: 2020-06-30 | End: 2020-06-30 | Stop reason: HOSPADM

## 2020-06-30 RX ADMIN — LORAZEPAM 2 MG: 1 TABLET ORAL at 17:31

## 2020-06-30 NOTE — ED PROVIDER NOTES
Patient arrives via police for reports of depressed mood with suicidal and homicidal ideations. However as soon as he arrived, he did elope without warning. Please had not pink slipped him and the patient was able to escape without notice. I had not seen or evaluated the patient personally. [place] [employer] was notified of the patient eloping.     Electronically signed by Deon Javed MD on 7/3/2020 at 7:02 AM       Deon Javed MD  07/03/20 7741

## 2020-06-30 NOTE — ED PROVIDER NOTES
constipation  : negative for dysuria, hematuria  Musculoskeletal: negative for myalgias, decreased ROM, joint swelling  Dermatological: negative for rash, wounds  Heme: Negative for bleeding, bruising      PAST MEDICAL HISTORY  Past Medical History:   Diagnosis Date    ADHD     ADHD (attention deficit hyperactivity disorder)     [facility]ocial personality disorder ([facility]     Bipolar disorder ([facility]     Conduct disorder     Manic depression ([facility]     OCD (obsessive compulsive disorder)     PTSD (post-traumatic stress disorder)     Schizophrenia ([facility]        CURRENT MEDICATIONS  [unfilled]    ALLERGIES  No Known Allergies    SURGICAL HISTORY  Past Surgical History:   Procedure Laterality Date    SHOULDER ARTHROSCOPY Right 6-    TEAR DUCT SURGERY      flushed       FAMILY HISTORY  Family History   Adopted: Yes       SOCIAL HISTORY  Social History     Socioeconomic History    Marital status: Single     Spouse name: Not on file    Number of children: Not on file    Years of education: Not on file    Highest education level: Not on file   Occupational History    Not on file   Social Needs    Financial resource strain: Not on file    Food insecurity     Worry: Not on file     Inability: Not on file    Transportation needs     Medical: Not on file     Non-medical: Not on file   Tobacco Use    Smoking status: Current Every Day Smoker     Packs/day: 2.00     Types: Cigarettes    Smokeless tobacco: Former User   Substance and Sexual Activity    Alcohol use:  Yes    Drug use: Yes     Types: Marijuana     Comment: Meth    Sexual activity: Not on file   Lifestyle    Physical activity     Days per week: Not on file     Minutes per session: Not on file    Stress: Not on file   Relationships    Social connections     Talks on phone: Not on file     Gets together: Not on file     Attends Hindu service: Not on file     Active member of club or organization: Not on file     Attends meetings of clubs or organizations: Not on file     Relationship status: Not on file    Intimate partner violence     Fear of current or ex partner: Not on file     Emotionally abused: Not on file     Physically abused: Not on file     Forced sexual activity: Not on file   Other Topics Concern    Not on file   Social History Narrative    Not on file         **Past medical, family and social histories, and nursing notes reviewed and verified by me**      PHYSICAL EXAM  VITAL SIGNS:   ED Triage Vitals   Enc Vitals Group      BP       Pulse       Resp       Temp       Temp src       SpO2       Weight       Height       Head Circumference       Peak Flow       Pain Score       Pain Loc       Pain Edu? Excl. in [address]? Vitals during ED course were reviewed and are as charted. Constitutional: Agitated and aggressive making threats to staff threatening to leave, non-toxic appearance  Eyes: Conjunctiva normal, No discharge  HENT: Normocephalic, Atraumatic, bilateral external ears normal, posterior oropharynx is nonerythematous and without exudate, uvula is midline, no trismus, no \"hot potato voice\" or dysphonia, oropharynx moist  Neck: Supple, no stridor, no grossly visible or palpable masses  Cardiovascular: Regular rate and rhythm, No murmurs, No rubs, No gallops  Pulmonary/Chest: Normal breath sounds, No respiratory distress or accessory muscle use, No wheezing, crackles or rhonchi. Abdomen: Soft, nondistended and nonrigid, No tenderness or peritoneal signs, No masses, normal bowel sounds  Back: No midline point tenderness, No paraspinous muscle tenderness.  No CVA tenderness  Extremities: No gross deformities, no edema, no tenderness  Neurologic: Normal motor function, Normal sensory function, No focal deficits  Skin: Warm, Dry, No erythema, No rash, No cyanosis, No mottling  Lymphatic: No lymphadenopathy in the following location(s): cervical  Psychiatric: Alert and oriented x3, Affect normal              RADIOLOGY/PROCEDURES/LABS/MEDICATIONS ADMINISTERED:    I have reviewed and interpreted all of the currently available lab results from this visit (if applicable):  Results for orders placed or performed during the hospital encounter of 06/30/20   Urinalysis with microscopic   Result Value Ref Range    Color, UA YELLOW YELLOW    Clarity, UA CLEAR CLEAR    Glucose, Urine NEGATIVE NEGATIVE MG/DL    Bilirubin Urine NEGATIVE NEGATIVE MG/DL    Ketones, Urine NEGATIVE NEGATIVE MG/DL    Specific Gravity, UA 1.027 1.001 - 1.035    Blood, Urine NEGATIVE NEGATIVE    pH, Urine 5.0 5.0 - 8.0    Protein, UA 30 (A) NEGATIVE MG/DL    Urobilinogen, Urine 1 0.2 - 1.0 MG/DL    Nitrite Urine, Quantitative NEGATIVE NEGATIVE    Leukocyte Esterase, Urine NEGATIVE NEGATIVE    RBC, UA <1 0 - 3 /HPF    WBC, UA 1 0 - 2 /HPF    Bacteria, UA NEGATIVE NEGATIVE /HPF    Mucus, UA MODERATE (A) NEGATIVE HPF    Trichomonas, UA NONE SEEN NONE SEEN /HPF    Hyaline Casts, UA 10 /LPF    Ca Oxalate Genesis, UA RARE /HPF   Drug screen multi urine   Result Value Ref Range    Cannabinoid Scrn, Ur UNCONFIRMED POSITIVE (A) NEGATIVE    Amphetamines UNCONFIRMED POSITIVE (A) NEGATIVE    Cocaine Metabolite NEGATIVE NEGATIVE    Benzodiazepine Screen, Urine NEGATIVE NEGATIVE    Barbiturate Screen, Ur NEGATIVE NEGATIVE    Opiates, Urine NEGATIVE NEGATIVE    Phencyclidine, Urine NEGATIVE NEGATIVE    Oxycodone NEGATIVE NEGATIVE          ABNORMAL LABS:  Labs Reviewed   URINALYSIS WITH MICROSCOPIC - Abnormal; Notable for the following components:       Result Value    Protein, UA 30 (*)     Mucus, UA MODERATE (*)     All other components within normal limits   URINE DRUG SCREEN - Abnormal; Notable for the following components:    Cannabinoid Scrn, Ur UNCONFIRMED POSITIVE (*)     Amphetamines UNCONFIRMED POSITIVE (*)     All other components within normal limits         IMAGING STUDIES ORDERED:  RESTRAINTS VIOLENT OR SELF-DESTRUCTIVE ADULT (OLDER THAN 17))  IP CONSULT TO PSYCHOLOGY      No orders to display         MEDICATIONS ADMINISTERED:  Medications   LORazepam (ATIVAN) tablet 2 mg (2 mg Oral Given 6/30/20 6311)         COURSE & MEDICAL DECISION MAKING  Last vitals: /70   Pulse 67   Temp 98.2 °F (36.8 °C) (Oral)   Resp 16   [id number]     23year-old male with depressed mood and suicidal ideations and possible homicidal ideations as well. No clinical evidence to suggest medical instability. Mental health specialist was consulted and has evaluated the patient. He was agitated and hostile and did require medications to help calm down. Additional workup and treatment in the ED as documented above. Pt will be admitted for further inpatient psychiatric evaluation and treatment. he is still awaiting placement. Patient signed out to Dr. Koki Liz. Clinical Impression:  1. Suicidal ideation    2. Depression, unspecified depression type        Disposition referral (if applicable):  No follow-up provider specified. Disposition medications (if applicable):  New Prescriptions    No medications on file       ED Provider Disposition Time  DISPOSITION            Electronically signed by: Leeanne Sever, M.D., 6/30/2020 11:58 PM      This dictation was created with voice recognition software. While attempts have been made to review the dictation as it is transcribed, on occasion the spoken word can be misinterpreted by the technology leading to omissions or inappropriate words, phrases or sentences.         Lorna Vila MD  06/30/20 6952       Lorna Vila MD  07/01/20 3714

## 2020-07-01 VITALS
RESPIRATION RATE: 15 BRPM | HEIGHT: 68 IN | DIASTOLIC BLOOD PRESSURE: 74 MMHG | TEMPERATURE: 98 F | WEIGHT: 160 LBS | SYSTOLIC BLOOD PRESSURE: 118 MMHG | BODY MASS INDEX: 24.25 KG/M2 | OXYGEN SATURATION: 98 % | HEART RATE: 74 BPM

## 2020-07-01 NOTE — ED NOTES
Pt resting in a position of comfort. No needs identified at this time. Bed in lowest position and sitter at bedside 1:1. Respirations even, no distress noted. Suicide precautions maintained.      Astrid Ring RN  07/01/20 0838

## 2020-07-01 NOTE — ED NOTES
Pt resting in a position of comfort. No needs identified at this time. Bed in lowest position and sitter at bedside 1:1. Respirations even, no distress noted. Suicide precautions maintained.      Avel Manuel RN  07/01/20 5350
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
## 2020-07-01 NOTE — ED PROVIDER NOTES
Patient is endorsed to me by Dr. Ralph Low at [facility]. In short, patient presented with suicidal ideation. The patient was placed in suicide precautions, patient's clothing and belongings were removed, documented and stored in the emergency department. Patient was reported to me to be medically cleared. I have examined the patient and noted a normal exam and stable vitals. Mental health have evaluated the patientand haverecommended that the patient be transferred to a inpatient psychiatric facility. We are currently awaiting placement for the patient. Patient transferred to [facility].               Alison Magaña MD  07/01/20 5459

## 2020-07-01 NOTE — ED NOTES
Unable to rouse pt from [facility] for assessment, will attempt again later     Benjmain Mccullough RN  06/30/20 2038

## 2020-07-01 NOTE — ED NOTES
Pt transported to [facility] by [facility] transport at this time.        Helga Cunha RN  07/01/20 3626

## 2020-07-01 NOTE — ED NOTES
Pt resting in a position of comfort. No needs identified at this time. Bed in lowest position and sitter at bedside 1:1. Respirations even, no distress noted. Suicide precautions maintained.      Astrid Ring RN  07/01/20 5606

## 2020-10-03 ENCOUNTER — HOSPITAL ENCOUNTER (EMERGENCY)
Age: 20
Discharge: HOME | End: 2020-10-03
Payer: COMMERCIAL

## 2020-10-03 ENCOUNTER — APPOINTMENT (OUTPATIENT)
Dept: GENERAL RADIOLOGY | Age: 20
End: 2020-10-03
Payer: COMMERCIAL

## 2020-10-03 VITALS
HEART RATE: 88 BPM | SYSTOLIC BLOOD PRESSURE: 114 MMHG | WEIGHT: 175 LBS | DIASTOLIC BLOOD PRESSURE: 81 MMHG | RESPIRATION RATE: 18 BRPM | TEMPERATURE: 98.1 F | BODY MASS INDEX: 26.52 KG/M2 | OXYGEN SATURATION: 96 % | HEIGHT: 68 IN

## 2020-10-03 PROCEDURE — 4500000028 HC INTERMEDIATE PROCEDURE

## 2020-10-03 PROCEDURE — 2500000003 HC RX 250 WO HCPCS: Performed by: PHYSICIAN ASSISTANT

## 2020-10-03 PROCEDURE — 73140 X-RAY EXAM OF FINGER(S): CPT

## 2020-10-03 PROCEDURE — 6370000000 HC RX 637 (ALT 250 FOR IP): Performed by: PHYSICIAN ASSISTANT

## 2020-10-03 PROCEDURE — 99283 EMERGENCY DEPT VISIT LOW MDM: CPT

## 2020-10-03 RX ORDER — CEPHALEXIN 500 MG/1
500 CAPSULE ORAL 4 TIMES DAILY
Qty: 28 CAPSULE | Refills: 0 | Status: SHIPPED | OUTPATIENT
Start: 2020-10-03 | End: 2020-10-10

## 2020-10-03 RX ORDER — BACITRACIN, NEOMYCIN, POLYMYXIN B 400; 3.5; 5 [USP'U]/G; MG/G; [USP'U]/G
OINTMENT TOPICAL
Qty: 1 TUBE | Refills: 0 | Status: SHIPPED | OUTPATIENT
Start: 2020-10-03 | End: 2020-10-13

## 2020-10-03 RX ORDER — DIAPER,BRIEF,INFANT-TODD,DISP
EACH MISCELLANEOUS ONCE
Status: COMPLETED | OUTPATIENT
Start: 2020-10-03 | End: 2020-10-03

## 2020-10-03 RX ORDER — CEPHALEXIN 500 MG/1
500 CAPSULE ORAL 4 TIMES DAILY
Qty: 28 CAPSULE | Refills: 0 | Status: SHIPPED | OUTPATIENT
Start: 2020-10-03 | End: 2020-10-03 | Stop reason: SDUPTHER

## 2020-10-03 RX ORDER — LIDOCAINE HYDROCHLORIDE 20 MG/ML
10 INJECTION, SOLUTION INFILTRATION; PERINEURAL ONCE
Status: COMPLETED | OUTPATIENT
Start: 2020-10-03 | End: 2020-10-03

## 2020-10-03 RX ADMIN — LIDOCAINE HYDROCHLORIDE 10 ML: 20 INJECTION, SOLUTION INFILTRATION; PERINEURAL at 13:10

## 2020-10-03 RX ADMIN — BACITRACIN ZINC 1 G: 500 OINTMENT TOPICAL at 13:49

## 2020-10-03 ASSESSMENT — PAIN SCALES - GENERAL
PAINLEVEL_OUTOF10: 8
PAINLEVEL_OUTOF10: 8

## 2020-10-03 NOTE — ED PROVIDER NOTES
eMERGENCY dEPARTMENT eNCOUnter         [address] [facility]    PCP: No primary care provider on file. CHIEF COMPLAINT    Chief Complaint   Patient presents with    Hand Pain     right 5th digit pain; smashed 2 1/2 weeks ago at work and has worsened; red and states that he has lost some since of feeling       HPI    Alexy Silva is a 23 y.o. male who presents with right fifth digit pain and swelling. Onset was prior to arrival, x2-1/2 weeks ago. Context patient states that he accidentally slammed a car door on the tip of his right fifth digit while at work. He did not seek medical evaluation imaging at that time. States the bruising resolved he has had progressively worsening pain as well as \"white\" discoloration along the nail folds. Awoke with 8/10 throbbing continuous pain throughout the distal aspect of the digit. Patient is ambidextrous. He is not a diabetic. Tetanus is reported up-to-date within the last 5 years. Patient has not noted any bloody or purulent drainage from the site. No fever chills. No other pain trauma or injury to the medial wrist, hand or wrist.  No fever or chills. No history of MRSA. REVIEW OF SYSTEMS    General: Denies fever or chills  Cardiac: Denies chest pain or chestwall pain. Pulmonary: Denies shortness of breath  GI: Denies abdominal pain, vomiting, or diarrhea  : No dysuria or hematuria    Denies any other muscles skeletal injuries, including elbow, shoulder,chest wall and back.     All other review of systems are negative  See HPI and nursing notes for additional information     PAST MEDICAL & SURGICAL HISTORY    Past Medical History:   Diagnosis Date    ADHD     ADHD (attention deficit hyperactivity disorder)     [facility]     Bipolar disorder [facility]     Conduct disorder     Manic depression ([facility]     OCD (obsessive compulsive disorder)     PTSD (post-traumatic stress disorder) None     Forced sexual activity: None   Other Topics Concern    None   Social History Narrative    None     Family History   Adopted: Yes           PHYSICAL EXAM    VITAL SIGNS: /81   Pulse 88   Temp 98.1 °F (36.7 °C) (Oral)   Resp 18   Ht 5' 8\" (1.727 m)   Wt 175 lb (79.4 kg)   SpO2 96%   BMI 26.61 kg/m²   Constitutional:  Well developed, well nourished, no acute distress, non-toxic appearance   HENT:  Atraumatic. Normocephalic. Moist mucus membranes    Musculoskeletal:    Right Hand:  Focal exam of the right fifth digit reveals no gross bony deformities. There is generalized edema and white purulence along the proximal and medial nail fold without spontaneous or expressible drainage. Localized tenderness to palpation in this area without crepitus. Pad of the digit is soft, no evidence of saline. No injury to the nailbed/nail plate, no subungual hematoma. No tenderness swelling or redness along the flexor aspect of the digit. Full range of motion of fifth digit IP and MCP joints without laxity or pain. Negative Knavels signs. Remaining digits are atraumatic nontender. Subjective decrease in station to light touch along the pad of the digit but sharp touch is intact. 5/5  strength. No wrist drop. Brisk capillary refill intact. Compartments are soft throughout the right upper extremity. No swelling, discoloration, tenderness to palpation, no range of motion deficit of the wrist or other fingers. Integument:  Well hydrated, no rash. skin intact  Vascular: affected extremity distally neurovascularly intact - sensation and capillary refill intact. Neurologic:  Alert and oriented. Appropriate thought pattern. Equal sensation over the bilateral deltoids and distally. No wrist drop. DTRs and distal sensation equal/intact.  5/5  strength of affected hand    Psychiatric: Cooperative, pleasant affect    RADIOLOGY/PROCEDURES       XR FINGER RIGHT (MIN 2 VIEWS) (Final result)   Result available for consultation, throughout entirety of  patient care. All pertinent Lab data and radiographic results reviewed with patient at bedside. The patient and/or the family were informed of the results of any tests/labs/imaging, the treatment plan, and time was allotted to answer questions. Differential diagnosis: includes but not limited to ligamentous injury, tendon injury, soft tissue contusion/hematoma, fracture, dislocation, Infection, Neurologic Deficit/Injury. Clinical  IMPRESSION    1. Paronychia of finger of right hand        Patient presents with right fifth digit distal pain and swelling after trauma 2 and half weeks ago. On exam, well-appearing nontoxic 75-year-old male, no acute distress. No gross bony deformity base extremity. There is localized swelling and white discoloration along the proximal and medial nail folds of the distal digit without evidence of subdural hematoma or nailbed injury. Pad of digit is soft, no evidence of felon. Negative Knavels signs. Full range of motion of the affected digit without pain or deficits at the IP or MCP joint. Presentation is most consistent with a posttraumatic paronychia. X-ray of the right fourth digit shows no evidence of acute osseous abnormality, soft tissue gas or osteomyelitis. I&D of paronychia was discussed with patient verbalized understanding agreement. He did tolerate procedure well. At this time, we discussed continued symptomatic care, oral antibiotics and warm water soaks with outpatient follow-up with PCP or back in the ED for serial wound rechecks. Did discuss importance of good follow-up and wound care as this could potentially lead to worsening finger infection that could lead to permanent disability, loss of vision, loss of limb or loss of life.  Low clinical suspicion for necrotizing fasciitis, sepsis, lymphangitis, felon, ischemic limb, compartment syndrome, intra-articular joint infection/septic arthritis, DVT, osteomyelitis, arterial/neurologic injury, necrotizing fasciitis, or infected/rapidly expanding hematoma. Patient is discharged in stable condition. Given prescription for topical bacitracin as well as oral Keflex. Patient agrees to have wound check in 48-72 hours, either with his PCP or back in the ED. Patient does not have PCP so I have given him/her doctor of the day contact information and encourage him/her to establish care and followup in the next 1-2 days. Encourage frequent warm compresses site to facilitate continued drainage. Return warnings back to the ED are discussed for increasing signs of infection including fever/chills, spreading redness/warmth, abdominal pain/nausea/vomiting. In light of current events, I did utilize appropriate PPE (including N95 face mask, safety glasses, gloves as recommended by the health facility/national standard best practice, during my bedside interactions with the patient. Patient was masked throughout ED course. Full droplet precaution as well as full PPE was followed throughout patient's ED course and evaluation. Diagnosis and plan discussed in detail with patient who understands and agrees. Patient agrees to return emergency department if symptoms worsen or any new symptoms develop. Comment: Please note this report has been produced using speech recognition software and may contain errors related to that system including errors in grammar, punctuation, and spelling, as well as words and phrases that may be inappropriate. If there are any questions or concerns please feel free to contact the dictating provider for clarification.           Sai Ozuna PA-C  10/03/20 2986

## 2020-12-07 ENCOUNTER — APPOINTMENT (OUTPATIENT)
Dept: GENERAL RADIOLOGY | Age: 20
End: 2020-12-07
Payer: COMMERCIAL

## 2020-12-07 ENCOUNTER — HOSPITAL ENCOUNTER (EMERGENCY)
Age: 20
Discharge: HOME | End: 2020-12-07
Attending: EMERGENCY MEDICINE
Payer: COMMERCIAL

## 2020-12-07 VITALS
OXYGEN SATURATION: 100 % | BODY MASS INDEX: 28.14 KG/M2 | WEIGHT: 190 LBS | HEIGHT: 69 IN | HEART RATE: 73 BPM | TEMPERATURE: 98 F | DIASTOLIC BLOOD PRESSURE: 84 MMHG | SYSTOLIC BLOOD PRESSURE: 128 MMHG | RESPIRATION RATE: 18 BRPM

## 2020-12-07 PROCEDURE — 73030 X-RAY EXAM OF SHOULDER: CPT

## 2020-12-07 PROCEDURE — 99285 EMERGENCY DEPT VISIT HI MDM: CPT

## 2020-12-07 RX ORDER — NABUMETONE 500 MG/1
500 TABLET, FILM COATED ORAL 2 TIMES DAILY
Qty: 20 TABLET | Refills: 1 | Status: SHIPPED | OUTPATIENT
Start: 2020-12-07 | End: 2021-10-05

## 2020-12-07 ASSESSMENT — PAIN DESCRIPTION - ORIENTATION: ORIENTATION: RIGHT

## 2020-12-07 ASSESSMENT — PAIN DESCRIPTION - LOCATION: LOCATION: SHOULDER

## 2020-12-07 ASSESSMENT — PAIN SCALES - GENERAL: PAINLEVEL_OUTOF10: 6

## 2020-12-07 ASSESSMENT — PAIN DESCRIPTION - DESCRIPTORS: DESCRIPTORS: SHARP

## 2020-12-07 ASSESSMENT — PAIN DESCRIPTION - FREQUENCY: FREQUENCY: CONTINUOUS

## 2020-12-07 NOTE — ED PROVIDER NOTES
Triage Chief Complaint:   Shoulder Injury (Pt arrives ambulatory from work stating at approx 0900 am today while lifting 80 lb part above head, felt pop to right shoulder, limited range of motion)    CAROLYNN Schwarz is a 23 y.o. male that presents to the ED with right shoulder pain happened when he was at work lifting an object he felt like something popped. Missed to having surgery many years ago x1 by an orthopedic surgeon in [facility]. He denies any weakness in his hands no numbness tingling his hand complains of numbness on palpation for slight touch sensation of the axillary nerve region. No cervical neck pain no radiculopathy. No direct injury trauma. Past Medical History:   Diagnosis Date    ADHD     ADHD (attention deficit hyperactivity disorder)     Antisocial personality disorder ([facility]     Bipolar disorder ([facility]     Conduct disorder     Depression     Manic depression ([facility])     OCD (obsessive compulsive disorder)     PTSD (post-traumatic stress disorder)     Schizophrenia ([facility]      Past Surgical History:   Procedure Laterality Date    SHOULDER ARTHROSCOPY Right 6-    TEAR DUCT SURGERY      flushed     Family History   Adopted: Yes     Social History     Socioeconomic History    Marital status: Single     Spouse name: Not on file    Number of children: Not on file    Years of education: Not on file    Highest education level: Not on file   Occupational History    Not on file   Social Needs    Financial resource strain: Not on file    Food insecurity     Worry: Not on file     Inability: Not on file    Transportation needs     Medical: Not on file     Non-medical: Not on file   Tobacco Use    Smoking status: Current Every Day Smoker     Packs/day: 0.50     Types: Cigarettes    Smokeless tobacco: Former User   Substance and Sexual Activity    Alcohol use:  Yes     Alcohol/week: 6.0 standard drinks     Types: 6 Cans of beer per week     Comment: 1-2 times weekly    Drug use: Yes     Types: Marijuana     Comment: Meth    Sexual activity: Yes     Partners: Female   Lifestyle    Physical activity     Days per week: Not on file     Minutes per session: Not on file    Stress: Not on file   Relationships    Social connections     Talks on phone: Not on file     Gets together: Not on file     Attends Hindu service: Not on file     Active member of club or organization: Not on file     Attends meetings of clubs or organizations: Not on file     Relationship status: Not on file    Intimate partner violence     Fear of current or ex partner: Not on file     Emotionally abused: Not on file     Physically abused: Not on file     Forced sexual activity: Not on file   Other Topics Concern    Not on file   Social History Narrative    Not on file     No current facility-administered medications for this encounter. Current Outpatient Medications   Medication Sig Dispense Refill    nabumetone (RELAFEN) 500 MG tablet Take 1 tablet by mouth 2 times daily 20 tablet 1    lisdexamfetamine (VYVANSE) 50 MG capsule Take 50 mg by mouth every morning.  QUEtiapine (SEROQUEL) 50 MG tablet Take 50 mg by mouth 2 times daily Takes 50 mg morning. 400 mg Night      Divalproex Sodium (DEPAKOTE PO) Take 500 mg by mouth 2 times daily 1000 mg Morning, 500 mg Night       No Known Allergies      ROS:    Review of Systems   Musculoskeletal: Positive for joint swelling. All other systems reviewed and are negative. Nursing Notes Reviewed    Physical Exam:  ED Triage Vitals   Enc Vitals Group      BP       Pulse       Resp       Temp       Temp src       SpO2       Weight       Height       Head Circumference       Peak Flow       Pain Score       Pain Loc       Pain Edu? Excl. in [facility]? Physical Exam  Vitals signs and nursing note reviewed. Exam conducted with a chaperone present. Constitutional:       Appearance: He is well-developed.    HENT:      Head: Normocephalic and atraumatic. Eyes:      Pupils: Pupils are equal, round, and reactive to light. Neck:      Musculoskeletal: Normal range of motion and neck supple. Musculoskeletal:      Right shoulder: He exhibits decreased range of motion, tenderness and pain. Arms:    Skin:     General: Skin is warm and dry. Neurological:      Mental Status: He is alert and oriented to person, place, and time. Sensory: Sensory deficit present. Motor: Motor function is intact. Deep Tendon Reflexes:      Reflex Scores:       Tricep reflexes are 2+ on the right side. Bicep reflexes are 2+ on the right side. Brachioradialis reflexes are 2+ on the right side. Comments: R axillary nerve decrease         I have reviewed and interpreted all of the currently available lab results from this visit (ifapplicable):  No results found for this visit on 12/07/20. Radiographs (if obtained):  [] The following radiograph wasinterpreted by myself in the absence of a radiologist:   [] Radiologist's Report Reviewed:  XR SHOULDER RIGHT (MIN 2 VIEWS)   Final Result   Inferior anterior subluxation of the humerus               EKG (if obtained): (All EKG's are interpreted by myself in the absence of a cardiologist)    Chart review shows recent radiographs:  No results found. MDM:      Presents with acute right shoulder pain. X-ray confirms subluxation inferiorly it is not dislocated. He will need orthopedic follow-up. Recommended rest ice  sling was given      Please note that portions of this note may have been completed with a voice recognition/dictation program. Efforts were made to edit the dictations but occasionally words are mis-transcribed.      All pertinent Lab data and radiographic results reviewed with patient at bedside. The patient and/or the family were informed of the results of any tests/labs/imaging, the treatment plan, and time was allotted to answer questions.  See chart for details of medications given during the ED stay.     The likelihood of other entities in the differential is insufficient to justify any further testing for them. This was explained to the patient. The patient was advised that persistent or worsening symptoms would require further evaluation.           Clinical Impression:  1. Strain of right shoulder, initial encounter      Disposition referral (if applicable):  Charles Templeton DO  [address] [place]edo [address] [place] [address]  [phone]    Schedule an appointment as soon as possible for a visit       Disposition medications (if applicable):  New Prescriptions    NABUMETONE (RELAFEN) 500 MG TABLET    Take 1 tablet by mouth 2 times daily           Rodriguez Fox DO, FACEP      Comment: Please note this report has been produced using speech recognition software and maycontain errors related to that system including errors in grammar, punctuation, and spelling, as well as words and phrases that may be inappropriate. If there are any questions or concerns please feel free to contact thedictating provider for clarification.         Tyson Islas DO  12/07/20 [id number]

## 2021-06-08 ENCOUNTER — HOSPITAL ENCOUNTER (EMERGENCY)
Age: 21
Discharge: LEFT BEFORE DISCHARGE | End: 2021-06-08
Attending: EMERGENCY MEDICINE
Payer: COMMERCIAL

## 2021-06-08 VITALS
SYSTOLIC BLOOD PRESSURE: 137 MMHG | HEIGHT: 69 IN | RESPIRATION RATE: 20 BRPM | HEART RATE: 103 BPM | WEIGHT: 200 LBS | DIASTOLIC BLOOD PRESSURE: 108 MMHG | OXYGEN SATURATION: 97 % | TEMPERATURE: 98.1 F | BODY MASS INDEX: 29.62 KG/M2

## 2021-06-08 DIAGNOSIS — S60.419A ABRASION OF MULTIPLE SITES OF RIGHT HAND AND FINGER, INITIAL ENCOUNTER: ICD-10-CM

## 2021-06-08 DIAGNOSIS — S09.90XA CLOSED HEAD INJURY, INITIAL ENCOUNTER: ICD-10-CM

## 2021-06-08 DIAGNOSIS — Z53.21 ELOPED FROM EMERGENCY DEPARTMENT: ICD-10-CM

## 2021-06-08 DIAGNOSIS — S01.111A LACERATION OF RIGHT EYEBROW, INITIAL ENCOUNTER: Primary | ICD-10-CM
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
## 2021-06-08 DIAGNOSIS — S60.511A ABRASION OF MULTIPLE SITES OF RIGHT HAND AND FINGER, INITIAL ENCOUNTER: ICD-10-CM

## 2021-06-08 PROCEDURE — 99283 EMERGENCY DEPT VISIT LOW MDM: CPT

## 2021-06-08 RX ORDER — AMOXICILLIN AND CLAVULANATE POTASSIUM 875; 125 MG/1; MG/1
1 TABLET, FILM COATED ORAL ONCE
Status: DISCONTINUED | OUTPATIENT
Start: 2021-06-08 | End: 2021-06-08 | Stop reason: HOSPADM

## 2021-06-08 RX ORDER — LIDOCAINE HYDROCHLORIDE AND EPINEPHRINE 10; 10 MG/ML; UG/ML
20 INJECTION, SOLUTION INFILTRATION; PERINEURAL ONCE
Status: DISCONTINUED | OUTPATIENT
Start: 2021-06-08 | End: 2021-06-08 | Stop reason: HOSPADM

## 2021-06-08 ASSESSMENT — PAIN SCALES - GENERAL: PAINLEVEL_OUTOF10: 5
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
## 2021-06-09 ENCOUNTER — APPOINTMENT (OUTPATIENT)
Dept: GENERAL RADIOLOGY | Age: 21
End: 2021-06-09
Payer: COMMERCIAL

## 2021-06-09 ENCOUNTER — APPOINTMENT (OUTPATIENT)
Dept: CT IMAGING | Age: 21
End: 2021-06-09
Payer: COMMERCIAL

## 2021-06-09 ENCOUNTER — HOSPITAL ENCOUNTER (EMERGENCY)
Age: 21
Discharge: HOME | End: 2021-06-09
Attending: EMERGENCY MEDICINE
Payer: COMMERCIAL

## 2021-06-09 VITALS
SYSTOLIC BLOOD PRESSURE: 157 MMHG | RESPIRATION RATE: 17 BRPM | BODY MASS INDEX: 28.63 KG/M2 | DIASTOLIC BLOOD PRESSURE: 99 MMHG | OXYGEN SATURATION: 98 % | HEART RATE: 70 BPM | HEIGHT: 70 IN | WEIGHT: 200 LBS | TEMPERATURE: 98.3 F

## 2021-06-09 DIAGNOSIS — S09.90XA INJURY OF HEAD, INITIAL ENCOUNTER: ICD-10-CM

## 2021-06-09 DIAGNOSIS — T14.8XXA BITE: ICD-10-CM
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
## 2021-06-09 PROCEDURE — 70450 CT HEAD/BRAIN W/O DYE: CPT

## 2021-06-09 PROCEDURE — 99285 EMERGENCY DEPT VISIT HI MDM: CPT

## 2021-06-09 PROCEDURE — 73130 X-RAY EXAM OF HAND: CPT

## 2021-06-09 PROCEDURE — 72125 CT NECK SPINE W/O DYE: CPT

## 2021-06-09 PROCEDURE — 6370000000 HC RX 637 (ALT 250 FOR IP): Performed by: EMERGENCY MEDICINE

## 2021-06-09 RX ORDER — AMOXICILLIN AND CLAVULANATE POTASSIUM 875; 125 MG/1; MG/1
1 TABLET, FILM COATED ORAL ONCE
Status: COMPLETED | OUTPATIENT
Start: 2021-06-09 | End: 2021-06-09

## 2021-06-09 RX ORDER — ACETAMINOPHEN 500 MG
1000 TABLET ORAL
Status: COMPLETED | OUTPATIENT
Start: 2021-06-09 | End: 2021-06-09

## 2021-06-09 RX ORDER — AMOXICILLIN AND CLAVULANATE POTASSIUM 875; 125 MG/1; MG/1
1 TABLET, FILM COATED ORAL 2 TIMES DAILY
Qty: 20 TABLET | Refills: 0 | Status: SHIPPED | OUTPATIENT
Start: 2021-06-09 | End: 2021-06-19

## 2021-06-09 RX ADMIN — ACETAMINOPHEN 1000 MG: 500 TABLET, FILM COATED ORAL at 06:36

## 2021-06-09 RX ADMIN — AMOXICILLIN AND CLAVULANATE POTASSIUM 1 TABLET: 875; 125 TABLET, FILM COATED ORAL at 08:07

## 2021-06-09 ASSESSMENT — PAIN SCALES - GENERAL
PAINLEVEL_OUTOF10: 7
PAINLEVEL_OUTOF10: 8
PAINLEVEL_OUTOF10: 6

## 2021-06-09 ASSESSMENT — ENCOUNTER SYMPTOMS
EYE PAIN: 0
EYE DISCHARGE: 0
BACK PAIN: 0
ABDOMINAL DISTENTION: 0
DIARRHEA: 0
COUGH: 0
SHORTNESS OF BREATH: 0
RHINORRHEA: 0
VOMITING: 0
SORE THROAT: 0

## 2021-06-09 NOTE — ED NOTES
Bed: ED-32  Expected date:   Expected time:   Means of arrival:   Comments:  EMS     Ledy Sun RN  06/09/21 7120
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
## 2021-10-05 VITALS
OXYGEN SATURATION: 97 % | HEIGHT: 71 IN | HEART RATE: 101 BPM | SYSTOLIC BLOOD PRESSURE: 165 MMHG | BODY MASS INDEX: 26.6 KG/M2 | RESPIRATION RATE: 20 BRPM | WEIGHT: 190 LBS | TEMPERATURE: 99.5 F | DIASTOLIC BLOOD PRESSURE: 81 MMHG

## 2021-10-05 PROCEDURE — 96375 TX/PRO/DX INJ NEW DRUG ADDON: CPT

## 2021-10-05 PROCEDURE — 96374 THER/PROPH/DIAG INJ IV PUSH: CPT

## 2021-10-05 PROCEDURE — 99284 EMERGENCY DEPT VISIT MOD MDM: CPT

## 2021-10-05 ASSESSMENT — PAIN DESCRIPTION - FREQUENCY: FREQUENCY: CONTINUOUS

## 2021-10-05 ASSESSMENT — PAIN DESCRIPTION - LOCATION: LOCATION: ABDOMEN

## 2021-10-05 ASSESSMENT — PAIN DESCRIPTION - PROGRESSION: CLINICAL_PROGRESSION: GRADUALLY WORSENING

## 2021-10-05 ASSESSMENT — PAIN DESCRIPTION - ORIENTATION: ORIENTATION: RIGHT

## 2021-10-05 ASSESSMENT — PAIN DESCRIPTION - ONSET: ONSET: ON-GOING

## 2021-10-05 ASSESSMENT — PAIN SCALES - GENERAL: PAINLEVEL_OUTOF10: 6

## 2021-10-05 ASSESSMENT — PAIN DESCRIPTION - PAIN TYPE: TYPE: ACUTE PAIN

## 2021-10-05 ASSESSMENT — PAIN - FUNCTIONAL ASSESSMENT: PAIN_FUNCTIONAL_ASSESSMENT: PREVENTS OR INTERFERES WITH MANY ACTIVE NOT PASSIVE ACTIVITIES

## 2021-10-05 ASSESSMENT — PAIN DESCRIPTION - DESCRIPTORS: DESCRIPTORS: PRESSURE;OTHER (COMMENT)

## 2021-10-06 ENCOUNTER — HOSPITAL ENCOUNTER (EMERGENCY)
Age: 21
Discharge: HOME | End: 2021-10-06
Attending: EMERGENCY MEDICINE
Payer: COMMERCIAL

## 2021-10-06 ENCOUNTER — APPOINTMENT (OUTPATIENT)
Dept: CT IMAGING | Age: 21
End: 2021-10-06
Payer: COMMERCIAL

## 2021-10-06 DIAGNOSIS — R10.84 GENERALIZED ABDOMINAL PAIN: ICD-10-CM

## 2021-10-06 DIAGNOSIS — R11.2 NAUSEA VOMITING AND DIARRHEA: Primary | ICD-10-CM

## 2021-10-06 DIAGNOSIS — R19.7 NAUSEA VOMITING AND DIARRHEA: Primary | ICD-10-CM

## 2021-10-06 LAB
ALBUMIN SERPL-MCNC: 4 GM/DL (ref 3.4–5)
ALP BLD-CCNC: 89 IU/L (ref 40–129)
ALT SERPL-CCNC: 90 U/L (ref 10–40)
ANION GAP SERPL CALCULATED.3IONS-SCNC: 13 MMOL/L (ref 4–16)
ANISOCYTOSIS: ABNORMAL
AST SERPL-CCNC: 79 IU/L (ref 15–37)
BACTERIA: NEGATIVE /HPF
BANDED NEUTROPHILS ABSOLUTE COUNT: 0.79 K/CU MM
BANDED NEUTROPHILS RELATIVE PERCENT: 7 % (ref 5–11)
BILIRUB SERPL-MCNC: 0.4 MG/DL (ref 0–1)
BILIRUBIN DIRECT: 0.2 MG/DL (ref 0–0.3)
BILIRUBIN URINE: NEGATIVE MG/DL
BILIRUBIN, INDIRECT: 0.2 MG/DL (ref 0–0.7)
BLOOD, URINE: NEGATIVE
BUN BLDV-MCNC: 8 MG/DL (ref 6–23)
CALCIUM SERPL-MCNC: 8.2 MG/DL (ref 8.3–10.6)
CHLORIDE BLD-SCNC: 100 MMOL/L (ref 99–110)
CLARITY: CLEAR
CO2: 21 MMOL/L (ref 21–32)
COLOR: ABNORMAL
CREAT SERPL-MCNC: 0.7 MG/DL (ref 0.9–1.3)
DIFFERENTIAL TYPE: ABNORMAL
GFR AFRICAN AMERICAN: >60 ML/MIN/1.73M2
GFR NON-AFRICAN AMERICAN: >60 ML/MIN/1.73M2
GLUCOSE BLD-MCNC: 103 MG/DL (ref 70–99)
GLUCOSE, URINE: NEGATIVE MG/DL
HCT VFR BLD CALC: 43.3 % (ref 42–52)
HEMOGLOBIN: 14.6 GM/DL (ref 13.5–18)
KETONES, URINE: NEGATIVE MG/DL
LACTATE: 1 MMOL/L (ref 0.4–2)
LEUKOCYTE ESTERASE, URINE: NEGATIVE
LIPASE: 28 IU/L (ref 13–60)
LYMPHOCYTES ABSOLUTE: 6.2 K/CU MM
LYMPHOCYTES RELATIVE PERCENT: 55 % (ref 24–44)
MAGNESIUM: 1.9 MG/DL (ref 1.8–2.4)
MCH RBC QN AUTO: 28.9 PG (ref 27–31)
MCHC RBC AUTO-ENTMCNC: 33.7 % (ref 32–36)
MCV RBC AUTO: 85.7 FL (ref 78–100)
MONOCYTES ABSOLUTE: 0.3 K/CU MM
MONOCYTES RELATIVE PERCENT: 3 % (ref 0–4)
MUCUS: ABNORMAL HPF
NITRITE URINE, QUANTITATIVE: NEGATIVE
PDW BLD-RTO: 12.6 % (ref 11.7–14.9)
PH, URINE: 5 (ref 5–8)
PLATELET # BLD: 134 K/CU MM (ref 140–440)
PLT MORPHOLOGY: ABNORMAL
PMV BLD AUTO: 10.6 FL (ref 7.5–11.1)
POLYCHROMASIA: ABNORMAL
POTASSIUM SERPL-SCNC: 3.2 MMOL/L (ref 3.5–5.1)
PROTEIN UA: 100 MG/DL
RBC # BLD: 5.05 M/CU MM (ref 4.6–6.2)
RBC URINE: 7 /HPF (ref 0–3)
SEGMENTED NEUTROPHILS ABSOLUTE COUNT: 4 K/CU MM
SEGMENTED NEUTROPHILS RELATIVE PERCENT: 35 % (ref 36–66)
SODIUM BLD-SCNC: 134 MMOL/L (ref 135–145)
SPECIFIC GRAVITY UA: 1.03 (ref 1–1.03)
SQUAMOUS EPITHELIAL: <1 /HPF
TOTAL PROTEIN: 7 GM/DL (ref 6.4–8.2)
TRICHOMONAS: ABNORMAL /HPF
UROBILINOGEN, URINE: 2 MG/DL (ref 0.2–1)
WBC # BLD: 11.3 K/CU MM (ref 4–10.5)
WBC # BLD: ABNORMAL 10*3/UL
WBC UA: 2 /HPF (ref 0–2)

## 2021-10-06 PROCEDURE — 81001 URINALYSIS AUTO W/SCOPE: CPT

## 2021-10-06 PROCEDURE — 6360000004 HC RX CONTRAST MEDICATION: Performed by: EMERGENCY MEDICINE

## 2021-10-06 PROCEDURE — 2580000003 HC RX 258: Performed by: EMERGENCY MEDICINE

## 2021-10-06 PROCEDURE — 6360000002 HC RX W HCPCS: Performed by: EMERGENCY MEDICINE

## 2021-10-06 PROCEDURE — 80053 COMPREHEN METABOLIC PANEL: CPT

## 2021-10-06 PROCEDURE — 83605 ASSAY OF LACTIC ACID: CPT

## 2021-10-06 PROCEDURE — 74177 CT ABD & PELVIS W/CONTRAST: CPT

## 2021-10-06 PROCEDURE — 6370000000 HC RX 637 (ALT 250 FOR IP): Performed by: EMERGENCY MEDICINE

## 2021-10-06 PROCEDURE — 82248 BILIRUBIN DIRECT: CPT

## 2021-10-06 PROCEDURE — 83690 ASSAY OF LIPASE: CPT

## 2021-10-06 PROCEDURE — 85027 COMPLETE CBC AUTOMATED: CPT

## 2021-10-06 PROCEDURE — 83735 ASSAY OF MAGNESIUM: CPT

## 2021-10-06 PROCEDURE — 85007 BL SMEAR W/DIFF WBC COUNT: CPT

## 2021-10-06 RX ORDER — POTASSIUM BICARBONATE 25 MEQ/1
50 TABLET, EFFERVESCENT ORAL ONCE
Status: COMPLETED | OUTPATIENT
Start: 2021-10-06 | End: 2021-10-06

## 2021-10-06 RX ORDER — POTASSIUM BICARBONATE 25 MEQ/1
50 TABLET, EFFERVESCENT ORAL ONCE
Status: DISCONTINUED | OUTPATIENT
Start: 2021-10-06 | End: 2021-10-06 | Stop reason: CLARIF

## 2021-10-06 RX ORDER — DIPHENHYDRAMINE HYDROCHLORIDE 50 MG/ML
25 INJECTION INTRAMUSCULAR; INTRAVENOUS ONCE
Status: COMPLETED | OUTPATIENT
Start: 2021-10-06 | End: 2021-10-06

## 2021-10-06 RX ORDER — 0.9 % SODIUM CHLORIDE 0.9 %
1000 INTRAVENOUS SOLUTION INTRAVENOUS ONCE
Status: COMPLETED | OUTPATIENT
Start: 2021-10-06 | End: 2021-10-06

## 2021-10-06 RX ORDER — PROMETHAZINE HYDROCHLORIDE 25 MG/1
25 TABLET ORAL EVERY 6 HOURS PRN
Qty: 12 TABLET | Refills: 0 | Status: SHIPPED | OUTPATIENT
Start: 2021-10-06 | End: 2021-10-13

## 2021-10-06 RX ORDER — DROPERIDOL 2.5 MG/ML
1.25 INJECTION, SOLUTION INTRAMUSCULAR; INTRAVENOUS EVERY 6 HOURS PRN
Status: DISCONTINUED | OUTPATIENT
Start: 2021-10-06 | End: 2021-10-06 | Stop reason: HOSPADM

## 2021-10-06 RX ORDER — MORPHINE SULFATE 2 MG/ML
4 INJECTION, SOLUTION INTRAMUSCULAR; INTRAVENOUS ONCE
Status: COMPLETED | OUTPATIENT
Start: 2021-10-06 | End: 2021-10-06

## 2021-10-06 RX ORDER — DICYCLOMINE HYDROCHLORIDE 10 MG/1
10 CAPSULE ORAL 4 TIMES DAILY
Qty: 40 CAPSULE | Refills: 0 | Status: SHIPPED | OUTPATIENT
Start: 2021-10-06

## 2021-10-06 RX ADMIN — MORPHINE SULFATE 4 MG: 2 INJECTION, SOLUTION INTRAMUSCULAR; INTRAVENOUS at 01:06

## 2021-10-06 RX ADMIN — IOPAMIDOL 75 ML: 755 INJECTION, SOLUTION INTRAVENOUS at 01:28

## 2021-10-06 RX ADMIN — SODIUM CHLORIDE 1000 ML: 9 INJECTION, SOLUTION INTRAVENOUS at 01:26

## 2021-10-06 RX ADMIN — DROPERIDOL 1.25 MG: 2.5 INJECTION, SOLUTION INTRAMUSCULAR; INTRAVENOUS at 01:28

## 2021-10-06 RX ADMIN — DIPHENHYDRAMINE HYDROCHLORIDE 25 MG: 50 INJECTION INTRAMUSCULAR; INTRAVENOUS at 01:28

## 2021-10-06 RX ADMIN — POTASSIUM BICARBONATE 50 MEQ: 977.5 TABLET, EFFERVESCENT ORAL at 02:26

## 2021-10-06 ASSESSMENT — PAIN SCALES - GENERAL
PAINLEVEL_OUTOF10: 8
PAINLEVEL_OUTOF10: 7

## 2021-10-06 NOTE — ED TRIAGE NOTES
Pt states that his symptoms for aprox. Two weeks. Pt complains of fever, shortness of breath, and abdominal pain. Pt states he has not taken any otc medications at this time.

## 2021-10-06 NOTE — ED NOTES
Reviewed discharge paperwork with pt. Answered all questions. Pt verbalized understanding.         Jt Moffett RN  10/06/21 2400

## 2021-10-08 NOTE — ED PROVIDER NOTES
Emergency Department Encounter    Patient: Yina Asif  MRN: [id number]  : 2000  Date of Evaluation: 10/8/2021  ED Provider:  Elpidio Stevens MD    Triage Chief Complaint:   Fever (two weeks), Abdominal Pain (two weeks, gradually increasing ), Emesis (mornings ), and Shortness of Breath    [place]:  Yina Asif is a 19 y.o. male with a past medical history of schizophrenia, PTSD that presents with abdominal pain, nausea, vomiting, fever and shortness of breath. Patient reports that symptoms started 2 weeks ago. Patient reports pain is 6 out of 10. Pain is located in his right flank/back and lower abdomen. patient has a history of similar intermittent abdominal pain. No diarrhea. Patient has not checked his temperature at home. No pain with urination no increase in urgency or frequency urination. No cough, congestion runny nose. No headache.     ROS - see HPI, below listed is current ROS at time of my eval:  General:  No fevers, no chills, no weakness  Eyes:  no discharge  ENT:  No sore throat, no nasal congestion  Cardiovascular:  No chest pain, no palpitations  Respiratory:  No shortness of breath, no cough, no wheezing  Gastrointestinal:  + pain, + nausea, + vomiting, no diarrhea  Musculoskeletal:  No muscle pain, no joint pain  Skin:  No rash, no pruritis  Neurologic:  no headache  Genitourinary:  No dysuria, no hematuria  Endocrine:  No unexpected weight gain, no unexpected weight loss  Extremities:  no edema, no pain    Past Medical History:   Diagnosis Date    ADHD     ADHD (attention deficit hyperactivity disorder)     Antisocial personality disorder (HCC)     Bipolar disorder (HCC)     Conduct disorder     Depression     [facility] depression ([facility] [address]     OCD (obsessive compulsive disorder)     PTSD (post-traumatic stress disorder)     Schizophrenia ([facility] [address]      Past Surgical History:   Procedure Laterality Date    SHOULDER ARTHROSCOPY Right 2015    TEAR DUCT SURGERY      flushed Family History   Adopted: Yes     Social History     Socioeconomic History    Marital status: Single     Spouse name: Not on file    Number of children: Not on file    Years of education: Not on file    Highest education level: Not on file   Occupational History    Not on file   Tobacco Use    Smoking status: Current Every Day Smoker     Packs/day: 0.50     Types: Cigarettes    Smokeless tobacco: Former User   Vaping Use    Vaping Use: Never used   Substance and Sexual Activity    Alcohol use: Yes     Alcohol/week: 6.0 standard drinks     Types: 6 Cans of beer per week     Comment: every now and then     Drug use: Yes     Types: Marijuana     Comment: Meth    Sexual activity: Yes     Partners: Female   Other Topics Concern    Not on file   Social History Narrative    Not on file     Social Determinants of Health     Financial Resource Strain:     Difficulty of Paying Living Expenses:    Food Insecurity:     Worried About Running Out of Food in the Last Year:     Ran Out of Food in the Last Year:    Transportation Needs:     Lack of Transportation (Medical):  Lack of Transportation (Non-Medical):    Physical Activity:     Days of Exercise per Week:     Minutes of Exercise per Session:    Stress:     Feeling of Stress :    Social Connections:     Frequency of Communication with Friends and Family:     Frequency of Social Gatherings with Friends and Family:     Attends Orthodoxy Services:     Active Member of Clubs or Organizations:     Attends Club or Organization Meetings:     Marital Status:    Intimate Partner Violence:     Fear of Current or Ex-Partner:     Emotionally Abused:     Physically Abused:     Sexually Abused:      No current facility-administered medications for this encounter.      Current Outpatient Medications   Medication Sig Dispense Refill    promethazine (PHENERGAN) 25 MG tablet Take 1 tablet by mouth every 6 hours as needed for Nausea 12 tablet 0    dicyclomine (BENTYL) 10 MG capsule Take 1 capsule by mouth 4 times daily 40 capsule 0     No Known Allergies    Nursing Notes Reviewed    Physical Exam:  Triage VS:    ED Triage Vitals [10/05/21 2354]   Enc Vitals Group      BP (!) 165/81      Pulse 101      Resp 20      Temp 99.5 °F (37.5 °C)      Temp Source Oral      SpO2 97 %      Weight 190 lb (86.2 kg)      Height 5' 11\" (1.803 m)      Head Circumference       Peak Flow       Pain Score       Pain Loc       Pain Edu? Excl. in [address]? My pulse ox interpretation is - normal    General appearance:  No acute distress. Skin:  Warm. Dry. No rash. Neck:  Trachea midline. Heart:  Regular rate and rhythm, normal S1 & S2.    Perfusion:  intact  Respiratory:  Lungs clear to auscultation bilaterally. Respirations nonlabored. Abdominal: Generalized lower abdominal tenderness to palpation, no rebound guarding or rigidity, neg Carter's sign, neg McBurney's point tenderness to palpation, normal bowel sounds, no masses, no organomegaly, no pulsatile masses  Back:  No CVA TTP  Extremity:    normal ROM   Neurological:  Alert and oriented times 3.       I have reviewed and interpreted all of the currently available lab results from this visit (if applicable):  Results for orders placed or performed during the hospital encounter of 10/06/21   CBC Auto Differential   Result Value Ref Range    WBC 11.3 (H) 4.0 - 10.5 K/CU MM    RBC 5.05 4.6 - 6.2 M/CU MM    Hemoglobin 14.6 13.5 - 18.0 GM/DL    Hematocrit 43.3 42 - 52 %    MCV 85.7 78 - 100 FL    MCH 28.9 27 - 31 PG    MCHC 33.7 32.0 - 36.0 %    RDW 12.6 11.7 - 14.9 %    Platelets 034 (L) 349 - 440 K/CU MM    MPV 10.6 7.5 - 11.1 FL    Bands Relative 7 5 - 11 %    Segs Relative 35.0 (L) 36 - 66 %    Lymphocytes % 55.0 (H) 24 - 44 %    Monocytes % 3.0 0 - 4 %    Bands Absolute 0.79 K/CU MM    Segs Absolute 4.0 K/CU MM    Lymphocytes Absolute 6.2 K/CU MM    Monocytes Absolute 0.3 K/CU MM    Differential Type MANUAL DIFFERENTIAL     Anisocytosis 1+     Polychromasia 1+     WBC Morphology MANY ATYPICAL LYMPHOCYTES SEEN ON SMEAR     PLT Morphology FEW LARGE PLATELETS SEEN ON SMEAR    Basic Metabolic Panel w/ Reflex to MG   Result Value Ref Range    Sodium 134 (L) 135 - 145 MMOL/L    Potassium 3.2 (L) 3.5 - 5.1 MMOL/L    Chloride 100 99 - 110 mMol/L    CO2 21 21 - 32 MMOL/L    Anion Gap 13 4 - 16    BUN 8 6 - 23 MG/DL    CREATININE 0.7 (L) 0.9 - 1.3 MG/DL    Glucose 103 (H) 70 - 99 MG/DL    Calcium 8.2 (L) 8.3 - 10.6 MG/DL    GFR Non-African American >60 >60 mL/min/1.73m2    GFR African American >60 >60 mL/min/1.73m2   Hepatic Function Panel   Result Value Ref Range    Albumin 4.0 3.4 - 5.0 GM/DL    Total Bilirubin 0.4 0.0 - 1.0 MG/DL    Bilirubin, Direct 0.2 0.0 - 0.3 MG/DL    Bilirubin, Indirect 0.2 0 - 0.7 MG/DL    Alkaline Phosphatase 89 40 - 129 IU/L    AST 79 (H) 15 - 37 IU/L    ALT 90 (H) 10 - 40 U/L    Total Protein 7.0 6.4 - 8.2 GM/DL   Lipase   Result Value Ref Range    Lipase 28 13 - 60 IU/L   Lactic Acid, Plasma   Result Value Ref Range    Lactate 1.0 0.4 - 2.0 mMOL/L   Urinalysis with microscopic   Result Value Ref Range    Color, UA SUREKHA (A) YELLOW    Clarity, UA CLEAR CLEAR    Glucose, Urine NEGATIVE NEGATIVE MG/DL    Bilirubin Urine NEGATIVE NEGATIVE MG/DL    Ketones, Urine NEGATIVE NEGATIVE MG/DL    Specific Gravity, UA 1.033 1.001 - 1.035    Blood, Urine NEGATIVE NEGATIVE    pH, Urine 5.0 5.0 - 8.0    Protein,  (A) NEGATIVE MG/DL    Urobilinogen, Urine 2.0 (H) 0.2 - 1.0 MG/DL    Nitrite Urine, Quantitative NEGATIVE NEGATIVE    Leukocyte Esterase, Urine NEGATIVE NEGATIVE    RBC, UA 7 (H) 0 - 3 /HPF    WBC, UA 2 0 - 2 /HPF    Bacteria, UA NEGATIVE NEGATIVE /HPF    Squam Epithel, UA <1 /HPF    Mucus, UA MANY (A) NEGATIVE HPF    Trichomonas, UA NONE SEEN NONE SEEN /HPF   Magnesium   Result Value Ref Range    Magnesium 1.9 1.8 - 2.4 mg/dl      Radiographs (if obtained):  Radiologist's Report Reviewed:  CT ABDOMEN PELVIS W IV CONTRAST Additional Contrast? None    Result Date: 10/7/2021  EXAMINATION: CT OF THE ABDOMEN AND PELVIS WITH CONTRAST 10/6/2021 1:28 am TECHNIQUE: CT of the abdomen and pelvis was performed with the administration of intravenous contrast. Multiplanar reformatted images are provided for review. Dose modulation, iterative reconstruction, and/or weight based adjustment of the mA/kV was utilized to reduce the radiation dose to as low as reasonably achievable. COMPARISON: January 7, 2020 HISTORY: ORDERING SYSTEM PROVIDED HISTORY: RIGHT LOWER QUADRANT ABDOMINAL PAIN TECHNOLOGIST PROVIDED HISTORY: Reason for exam:->RIGHT LOWER QUADRANT ABDOMINAL PAIN Additional Contrast?->None Decision Support Exception - unselect if not a suspected or confirmed emergency medical condition->Emergency Medical Condition (MA) Reason for Exam: RIGHT LOWER QUADRANT ABDOMINAL PAIN Acuity: Acute Type of Exam: Initial Additional signs and symptoms: Fever; Abdominal Pain; Emesis; Shortness of Breath FINDINGS: Lower Chest: Clear lung bases. Organs: The liver, gallbladder, spleen, pancreas, adrenal glands, and kidneys demonstrate no acute abnormality. Bilateral ureters are normal in course and caliber. No ureteral calculi. GI/Bowel: Small hiatal hernia. The stomach, small bowel, and colon are normal in course and caliber without evidence of wall thickening or obstruction. Normal appendix. Pelvis: Normal bladder. Prostate and seminal vesicles are unremarkable. Peritoneum/Retroperitoneum: No free fluid or free air. No pathologic lymphadenopathy. Aorta and its branches are patent and normal in course and caliber. No significant atherosclerosis. Bones/Soft Tissues: No acute or aggressive osseous lesion. 1. No acute intra-abdominal abnormality. 2. Normal appendix.      Medications Given     sodium chloride Stopped 10/6/2021 2:26 AM    diphenhydrAMINE (BENADRYL) Last given 10/6/2021 1:28 AM    droperidol (INAPSINE) Last given 10/6/2021 1:28 AM    iopamidol (ISOVUE-370) Last given 10/6/2021 1:28 AM    morphine (PF) Last given 10/6/2021 1:06 AM    K-Effervescent (potassium bicarbonate) Last given 10/6/2021 2:26 AM     MDM:  Patient here with abdominal pain. I estimate there is LOW risk for an acute emergent intraabdominal process including, but not limited to, acute appendicitis, bowel obstruction, acute cholecystitis, ruptured diverticulitis, incarcerated/strangling hernia,  perforated bowel/ulcer. Workup reveals no acute intraabdominal abnormality on imaging. Laboratory analysis showed mild hypokalemia. Urinalysis showed no signs of infection. Patient's abdominal exam in the ED is nonsurgical.  I do feel the Patient can be discharged home. I have discussed the results, plan for treatment and possible risks, and patient agrees with discharging home with close follow-up. Patient understands and agrees with the plan, return warnings given. We have discussed the symptoms which are most concerning that necessitate immediate return. Clinical Impression:  1. Nausea vomiting and diarrhea    2.  Generalized abdominal pain      Disposition referral (if applicable):    Please call your gastroenterologist first thing tomorrow morning for close follow-up and further recommendations  Call   For close follow-up and further recommendations    Disposition medications (if applicable):  Discharge Medication List as of 10/6/2021  2:13 AM      START taking these medications    Details   promethazine (PHENERGAN) 25 MG tablet Take 1 tablet by mouth every 6 hours as needed for Nausea, Disp-12 tablet, R-0Normal      dicyclomine (BENTYL) 10 MG capsule Take 1 capsule by mouth 4 times daily, Disp-40 capsule, R-0Normal           ED Provider Disposition Time  DISPOSITION Decision To Discharge 10/06/2021 01:57:45 AM      Comment: Please note this report has been produced using speech recognition software and may contain errors related to that system including errors in grammar, punctuation, and spelling, as well as words and phrases that may be inappropriate. Efforts were made to edit the dictations.        Miriam Calvo MD  10/08/21 8060

## 2023-01-01 ENCOUNTER — HOSPITAL ENCOUNTER (EMERGENCY)
Age: 23
End: 2023-11-10
Attending: STUDENT IN AN ORGANIZED HEALTH CARE EDUCATION/TRAINING PROGRAM
Payer: COMMERCIAL

## 2023-01-01 DIAGNOSIS — I46.9 CARDIOPULMONARY ARREST (HCC): Primary | ICD-10-CM

## 2023-01-01 PROCEDURE — 96375 TX/PRO/DX INJ NEW DRUG ADDON: CPT

## 2023-01-01 PROCEDURE — 96374 THER/PROPH/DIAG INJ IV PUSH: CPT

## 2023-01-01 PROCEDURE — 31500 INSERT EMERGENCY AIRWAY: CPT

## 2023-01-01 PROCEDURE — 99285 EMERGENCY DEPT VISIT HI MDM: CPT

## 2023-01-01 PROCEDURE — 6360000002 HC RX W HCPCS: Performed by: STUDENT IN AN ORGANIZED HEALTH CARE EDUCATION/TRAINING PROGRAM

## 2023-01-01 PROCEDURE — 2500000003 HC RX 250 WO HCPCS: Performed by: STUDENT IN AN ORGANIZED HEALTH CARE EDUCATION/TRAINING PROGRAM

## 2023-01-01 PROCEDURE — 92950 HEART/LUNG RESUSCITATION CPR: CPT

## 2023-01-01 RX ORDER — EPINEPHRINE IN SOD CHLOR,ISO 1 MG/10 ML
SYRINGE (ML) INTRAVENOUS DAILY PRN
Status: COMPLETED | OUTPATIENT
Start: 2023-01-01 | End: 2023-01-01

## 2023-01-01 RX ORDER — NALOXONE HYDROCHLORIDE 0.4 MG/ML
INJECTION, SOLUTION INTRAMUSCULAR; INTRAVENOUS; SUBCUTANEOUS DAILY PRN
Status: COMPLETED | OUTPATIENT
Start: 2023-01-01 | End: 2023-01-01

## 2023-01-01 RX ORDER — CALCIUM CHLORIDE 100 MG/ML
INJECTION INTRAVENOUS; INTRAVENTRICULAR DAILY PRN
Status: COMPLETED | OUTPATIENT
Start: 2023-01-01 | End: 2023-01-01

## 2023-01-01 RX ORDER — MAGNESIUM SULFATE HEPTAHYDRATE 500 MG/ML
INJECTION, SOLUTION INTRAMUSCULAR; INTRAVENOUS DAILY PRN
Status: COMPLETED | OUTPATIENT
Start: 2023-01-01 | End: 2023-01-01

## 2023-01-01 RX ADMIN — EPINEPHRINE 1 MG: 0.1 INJECTION, SOLUTION ENDOTRACHEAL; INTRACARDIAC; INTRAVENOUS at 20:06

## 2023-01-01 RX ADMIN — CALCIUM CHLORIDE 1000 MG: 100 INJECTION, SOLUTION INTRAVENOUS; INTRAVENTRICULAR at 20:13

## 2023-01-01 RX ADMIN — SODIUM BICARBONATE 50 MEQ: 84 INJECTION, SOLUTION INTRAVENOUS at 20:13

## 2023-01-01 RX ADMIN — CALCIUM CHLORIDE 1000 MG: 100 INJECTION, SOLUTION INTRAVENOUS; INTRAVENTRICULAR at 20:05

## 2023-01-01 RX ADMIN — EPINEPHRINE 1 MG: 0.1 INJECTION, SOLUTION ENDOTRACHEAL; INTRACARDIAC; INTRAVENOUS at 20:19

## 2023-01-01 RX ADMIN — MAGNESIUM SULFATE HEPTAHYDRATE 1000 MG: 500 INJECTION, SOLUTION INTRAMUSCULAR; INTRAVENOUS at 19:55

## 2023-01-01 RX ADMIN — EPINEPHRINE 1 MG: 0.1 INJECTION, SOLUTION ENDOTRACHEAL; INTRACARDIAC; INTRAVENOUS at 19:55

## 2023-01-01 RX ADMIN — EPINEPHRINE 1 MG: 0.1 INJECTION, SOLUTION ENDOTRACHEAL; INTRACARDIAC; INTRAVENOUS at 20:11

## 2023-01-01 RX ADMIN — EPINEPHRINE 1 MG: 0.1 INJECTION, SOLUTION ENDOTRACHEAL; INTRACARDIAC; INTRAVENOUS at 19:51

## 2023-01-01 RX ADMIN — NALOXONE HYDROCHLORIDE 2 MG: 0.4 INJECTION, SOLUTION INTRAMUSCULAR; INTRAVENOUS; SUBCUTANEOUS at 19:49

## 2023-01-01 RX ADMIN — SODIUM BICARBONATE 50 MEQ: 84 INJECTION, SOLUTION INTRAVENOUS at 20:03

## 2023-01-08 ENCOUNTER — HOSPITAL ENCOUNTER (EMERGENCY)
Age: 23
Discharge: HOME | End: 2023-01-08
Attending: EMERGENCY MEDICINE
Payer: COMMERCIAL

## 2023-01-08 ENCOUNTER — APPOINTMENT (OUTPATIENT)
Dept: CT IMAGING | Age: 23
End: 2023-01-08
Payer: COMMERCIAL

## 2023-01-08 VITALS
HEART RATE: 105 BPM | TEMPERATURE: 98 F | RESPIRATION RATE: 16 BRPM | OXYGEN SATURATION: 97 % | DIASTOLIC BLOOD PRESSURE: 88 MMHG | SYSTOLIC BLOOD PRESSURE: 138 MMHG

## 2023-01-08 VITALS
OXYGEN SATURATION: 97 % | DIASTOLIC BLOOD PRESSURE: 93 MMHG | RESPIRATION RATE: 18 BRPM | HEART RATE: 101 BPM | SYSTOLIC BLOOD PRESSURE: 129 MMHG | TEMPERATURE: 98.4 F

## 2023-01-08 DIAGNOSIS — R10.32 ABDOMINAL PAIN, LEFT LOWER QUADRANT: Primary | ICD-10-CM

## 2023-01-08 DIAGNOSIS — L02.211 CUTANEOUS ABSCESS OF ABDOMINAL WALL: ICD-10-CM

## 2023-01-08 DIAGNOSIS — F19.10 POLYSUBSTANCE ABUSE (HCC): Primary | ICD-10-CM

## 2023-01-08 LAB
ALBUMIN SERPL-MCNC: 5 GM/DL (ref 3.4–5)
ALP BLD-CCNC: 80 IU/L (ref 40–129)
ALT SERPL-CCNC: 33 U/L (ref 10–40)
AMPHETAMINES: ABNORMAL
ANION GAP SERPL CALCULATED.3IONS-SCNC: 12 MMOL/L (ref 4–16)
AST SERPL-CCNC: 65 IU/L (ref 15–37)
BACTERIA: NEGATIVE /HPF
BARBITURATE SCREEN URINE: NEGATIVE
BASOPHILS ABSOLUTE: 0 K/CU MM
BASOPHILS RELATIVE PERCENT: 0.4 % (ref 0–1)
BENZODIAZEPINE SCREEN, URINE: NEGATIVE
BILIRUB SERPL-MCNC: 1.1 MG/DL (ref 0–1)
BILIRUBIN URINE: ABNORMAL MG/DL
BLOOD, URINE: NEGATIVE
BUN BLDV-MCNC: 16 MG/DL (ref 6–23)
CALCIUM SERPL-MCNC: 9.7 MG/DL (ref 8.3–10.6)
CANNABINOID SCREEN URINE: ABNORMAL
CHLORIDE BLD-SCNC: 100 MMOL/L (ref 99–110)
CLARITY: CLEAR
CO2: 25 MMOL/L (ref 21–32)
COCAINE METABOLITE: ABNORMAL
COLOR: YELLOW
CREAT SERPL-MCNC: 0.8 MG/DL (ref 0.9–1.3)
DIFFERENTIAL TYPE: ABNORMAL
EOSINOPHILS ABSOLUTE: 0.1 K/CU MM
EOSINOPHILS RELATIVE PERCENT: 0.7 % (ref 0–3)
GFR SERPL CREATININE-BSD FRML MDRD: >60 ML/MIN/1.73M2
GLUCOSE BLD-MCNC: 106 MG/DL (ref 70–99)
GLUCOSE, URINE: NEGATIVE MG/DL
HCT VFR BLD CALC: 47.6 % (ref 42–52)
HEMOGLOBIN: 15.8 GM/DL (ref 13.5–18)
IMMATURE NEUTROPHIL %: 0.4 % (ref 0–0.43)
KETONES, URINE: 15 MG/DL
LEUKOCYTE ESTERASE, URINE: NEGATIVE
LIPASE: 18 IU/L (ref 13–60)
LYMPHOCYTES ABSOLUTE: 2.4 K/CU MM
LYMPHOCYTES RELATIVE PERCENT: 32.1 % (ref 24–44)
MCH RBC QN AUTO: 29.4 PG (ref 27–31)
MCHC RBC AUTO-ENTMCNC: 33.2 % (ref 32–36)
MCV RBC AUTO: 88.6 FL (ref 78–100)
MONOCYTES ABSOLUTE: 0.5 K/CU MM
MONOCYTES RELATIVE PERCENT: 6.6 % (ref 0–4)
MUCUS: ABNORMAL HPF
NITRITE URINE, QUANTITATIVE: NEGATIVE
NUCLEATED RBC %: 0 %
OPIATES, URINE: NEGATIVE
OXYCODONE: NEGATIVE
PDW BLD-RTO: 12.8 % (ref 11.7–14.9)
PH, URINE: 6 (ref 5–8)
PHENCYCLIDINE, URINE: NEGATIVE
PLATELET # BLD: 189 K/CU MM (ref 140–440)
PMV BLD AUTO: 9.5 FL (ref 7.5–11.1)
POTASSIUM SERPL-SCNC: 3.5 MMOL/L (ref 3.5–5.1)
PROTEIN UA: ABNORMAL MG/DL
RBC # BLD: 5.37 M/CU MM (ref 4.6–6.2)
RBC URINE: <1 /HPF (ref 0–3)
SEGMENTED NEUTROPHILS ABSOLUTE COUNT: 4.5 K/CU MM
SEGMENTED NEUTROPHILS RELATIVE PERCENT: 59.8 % (ref 36–66)
SODIUM BLD-SCNC: 137 MMOL/L (ref 135–145)
SPECIFIC GRAVITY UA: 1.02 (ref 1–1.03)
TOTAL IMMATURE NEUTOROPHIL: 0.03 K/CU MM
TOTAL NUCLEATED RBC: 0 K/CU MM
TOTAL PROTEIN: 8 GM/DL (ref 6.4–8.2)
TRICHOMONAS: ABNORMAL /HPF
UROBILINOGEN, URINE: 1 MG/DL (ref 0.2–1)
WBC # BLD: 7.4 K/CU MM (ref 4–10.5)
WBC UA: <1 /HPF (ref 0–2)

## 2023-01-08 PROCEDURE — 80053 COMPREHEN METABOLIC PANEL: CPT

## 2023-01-08 PROCEDURE — 81003 URINALYSIS AUTO W/O SCOPE: CPT

## 2023-01-08 PROCEDURE — 83690 ASSAY OF LIPASE: CPT

## 2023-01-08 PROCEDURE — 85025 COMPLETE CBC W/AUTO DIFF WBC: CPT

## 2023-01-08 PROCEDURE — 74177 CT ABD & PELVIS W/CONTRAST: CPT

## 2023-01-08 PROCEDURE — 99283 EMERGENCY DEPT VISIT LOW MDM: CPT

## 2023-01-08 PROCEDURE — 6360000004 HC RX CONTRAST MEDICATION: Performed by: EMERGENCY MEDICINE

## 2023-01-08 PROCEDURE — 80307 DRUG TEST PRSMV CHEM ANLYZR: CPT

## 2023-01-08 PROCEDURE — 6370000000 HC RX 637 (ALT 250 FOR IP): Performed by: EMERGENCY MEDICINE

## 2023-01-08 PROCEDURE — 96374 THER/PROPH/DIAG INJ IV PUSH: CPT

## 2023-01-08 PROCEDURE — 99285 EMERGENCY DEPT VISIT HI MDM: CPT | Performed by: EMERGENCY MEDICINE

## 2023-01-08 PROCEDURE — 6360000002 HC RX W HCPCS: Performed by: EMERGENCY MEDICINE

## 2023-01-08 RX ORDER — IBUPROFEN 600 MG/1
600 TABLET ORAL 3 TIMES DAILY PRN
Qty: 30 TABLET | Refills: 0 | Status: SHIPPED | OUTPATIENT
Start: 2023-01-08

## 2023-01-08 RX ORDER — SULFAMETHOXAZOLE AND TRIMETHOPRIM 800; 160 MG/1; MG/1
1 TABLET ORAL 2 TIMES DAILY
Qty: 20 TABLET | Refills: 0 | Status: SHIPPED | OUTPATIENT
Start: 2023-01-08 | End: 2023-01-18

## 2023-01-08 RX ORDER — KETOROLAC TROMETHAMINE 30 MG/ML
30 INJECTION, SOLUTION INTRAMUSCULAR; INTRAVENOUS ONCE
Status: COMPLETED | OUTPATIENT
Start: 2023-01-08 | End: 2023-01-08

## 2023-01-08 RX ORDER — CEPHALEXIN 500 MG/1
500 CAPSULE ORAL 4 TIMES DAILY
Qty: 28 CAPSULE | Refills: 0 | Status: SHIPPED | OUTPATIENT
Start: 2023-01-08 | End: 2023-01-15

## 2023-01-08 RX ORDER — LORAZEPAM 1 MG/1
1 TABLET ORAL ONCE
Status: COMPLETED | OUTPATIENT
Start: 2023-01-08 | End: 2023-01-08

## 2023-01-08 RX ADMIN — IOPAMIDOL 80 ML: 755 INJECTION, SOLUTION INTRAVENOUS at 17:19

## 2023-01-08 RX ADMIN — LORAZEPAM 1 MG: 1 TABLET ORAL at 02:14

## 2023-01-08 RX ADMIN — KETOROLAC TROMETHAMINE 30 MG: 30 INJECTION, SOLUTION INTRAMUSCULAR; INTRAVENOUS at 18:10

## 2023-01-08 ASSESSMENT — LIFESTYLE VARIABLES
HOW OFTEN DO YOU HAVE A DRINK CONTAINING ALCOHOL: 4 OR MORE TIMES A WEEK
HOW MANY STANDARD DRINKS CONTAINING ALCOHOL DO YOU HAVE ON A TYPICAL DAY: 7 TO 9

## 2023-01-08 ASSESSMENT — PAIN SCALES - GENERAL: PAINLEVEL_OUTOF10: 8

## 2023-01-08 NOTE — ED NOTES
Pt arrived via ems with c/o left lower abdominal pain. Pt rates his pain a 8/10. Pt is a/o.       Ninfa Layton RN  01/08/23 7911

## 2023-01-08 NOTE — ED PROVIDER NOTES
[facility] Department Encounter    Patient: Miri Rocha  MRN: [id number]  : 2000  Date of Evaluation: 2023  ED Provider:  Yao Walker DO    Triage Chief Complaint:   Abdominal Pain (Knot on his left lower side )    [place]:  iMri Rocha is a 23 y.o. male that presents emergency department complaint of left lower abdominal pain that started states 3 days ago. Patient states he always had  Abdominal pain. Patient states history of Crohn's disease. Patient states also has irritable bowel and acid reflux. Patient states he is on medicine for Crohn's but not for acid reflux. Patient states he does have a gastroenterologist Dr. Sunil Thompson that he follows with. Patient states pain got worse today. Patient currently eating a [employer] sandwich upon arrival to the ER. Patient denies any nausea vomiting states he does have some diarrhea. He states he has not taken anything for the abdominal pain. Patient had abdominal pain 8 out of 10 on the pain scale no radiation of the abdominal pain. States last bowel movement this morning patient denies any abdominal surgeries in the past no dysuria hematuria no testicular scrotal or penis pain and swelling no falls injuries or trauma no heavy lifting pulling or straining. He denies any kidney stone no sick contacts. Patient here for evaluation.     ROS - see HPI, below listed is current ROS at time of my eval:  General:  No fevers, no chills, no weakness  Eyes:  No recent vison changes, no discharge  ENT:  No sore throat, no nasal congestion, no hearing changes  Cardiovascular:  No chest pain, no palpitations  Respiratory:  No shortness of breath, no cough, no wheezing  Gastrointestinal: Positive for abdominal pain, diarrhea, no nausea or vomiting  Musculoskeletal:  No muscle pain, no joint pain  Skin:  No rash, no pruritis, no easy bruising  Neurologic:  No speech problems, no headache, no extremity numbness, no extremity tingling, no extremity weakness  Psychiatric:  No anxiety  Genitourinary:  No dysuria, no hematuria  Endocrine:  No unexpected weight gain, no unexpected weight loss  Extremities:  no edema, no pain    Past Medical History:   Diagnosis Date    ADHD     ADHD (attention deficit hyperactivity disorder)     Antisocial personality disorder (HCC)     Bipolar disorder (HCC)     Conduct disorder     Depression     Manic depression (HCC)     OCD (obsessive compulsive disorder)     PTSD (post-traumatic stress disorder)     Schizophrenia ([facility] [address]      Past Surgical History:   Procedure Laterality Date    SHOULDER ARTHROSCOPY Right 6-    TEAR DUCT SURGERY      flushed     Family History   Adopted: Yes     Social History     Socioeconomic History    Marital status: Single     Spouse name: Not on file    Number of children: Not on file    Years of education: Not on file    Highest education level: Not on file   Occupational History    Not on file   Tobacco Use    Smoking status: Every Day     Packs/day: 0.50     Types: Cigarettes    Smokeless tobacco: Former   Vaping Use    Vaping Use: Never used   Substance and Sexual Activity    Alcohol use: Yes     Alcohol/week: 6.0 standard drinks     Types: 6 Cans of beer per week     Comment: every now and then     Drug use: Yes     Types: Marijuana Ramana Valencia)     Comment: Meth    Sexual activity: Yes     Partners: Female   Other Topics Concern    Not on file   Social History Narrative    Not on file     Social Determinants of Health     Financial Resource Strain: Not on file   Food Insecurity: Not on file   Transportation Needs: Not on file   Physical Activity: Not on file   Stress: Not on file   Social Connections: Not on file   Intimate Partner Violence: Not on file   Housing Stability: Not on file     No current facility-administered medications for this encounter.      Current Outpatient Medications   Medication Sig Dispense Refill    dicyclomine (BENTYL) 10 MG capsule Take 1 capsule by mouth 4 times daily 40 capsule 0     Allergies   Allergen Reactions    Depakote [Divalproex Sodium] Hallucinations     Caused pt to be violent     Seroquel [Quetiapine Fumarate] Other (See Comments)     States he had restless legs and became violent        Nursing Notes Reviewed    Physical Exam:  Triage VS:    ED Triage Vitals [01/08/23 1528]   Enc Vitals Group      BP (!) 150/97      Heart Rate (!) 103      Resp 18      Temp 98.3 °F (36.8 °C)      Temp Source Oral      SpO2 97 %      Weight       Height       Head Circumference       Peak Flow       Pain Score       Pain Loc       Pain Edu? Excl. in [address]? /88   Pulse (!) 105   Temp 98 °F (36.7 °C) (Oral)   Resp 16   SpO2 97%       My pulse ox interpretation is - normal    General appearance:  No acute distress. Skin:  Warm. Dry. Eye:  Extraocular movements intact. Ears, nose, mouth and throat:  Oral mucosa moist   Neck:  Trachea midline. Extremity:  No swelling. Normal ROM     Heart: Sinus tachycardia, normal S1 & S2, no extra heart sounds. Perfusion:  intact  Respiratory:  Lungs clear to auscultation bilaterally. Respirations nonlabored. Abdominal:  Normal bowel sounds. Soft. Lower abdominal pain and tenderness to palpation no rigidity guarding or rebound. Patient does have a small area on the left lower abdomen is hard tender to palpation. Lymph node vs early abscess. No fluctuace. non distended. Back:  No CVA tenderness to palpation     Neurological:  Alert and oriented times 3. No focal neuro deficits.              Psychiatric:  Appropriate    I have reviewed and interpreted all of the currently available lab results from this visit (if applicable):  Results for orders placed or performed during the hospital encounter of 01/08/23   CBC with Diff   Result Value Ref Range    WBC 7.4 4.0 - 10.5 K/CU MM    RBC 5.37 4.6 - 6.2 M/CU MM    Hemoglobin 15.8 13.5 - 18.0 GM/DL    Hematocrit 47.6 42 - 52 %    MCV 88.6 78 - 100 FL    MCH 29.4 27 - 31 PG MCHC 33.2 32.0 - 36.0 %    RDW 12.8 11.7 - 14.9 %    Platelets 270 982 - 514 K/CU MM    MPV 9.5 7.5 - 11.1 FL    Differential Type AUTOMATED DIFFERENTIAL     Segs Relative 59.8 36 - 66 %    Lymphocytes % 32.1 24 - 44 %    Monocytes % 6.6 (H) 0 - 4 %    Eosinophils % 0.7 0 - 3 %    Basophils % 0.4 0 - 1 %    Segs Absolute 4.5 K/CU MM    Lymphocytes Absolute 2.4 K/CU MM    Monocytes Absolute 0.5 K/CU MM    Eosinophils Absolute 0.1 K/CU MM    Basophils Absolute 0.0 K/CU MM    Nucleated RBC % 0.0 %    Total Nucleated RBC 0.0 K/CU MM    Total Immature Neutrophil 0.03 K/CU MM    Immature Neutrophil % 0.4 0 - 0.43 %   CMP   Result Value Ref Range    Sodium 137 135 - 145 MMOL/L    Potassium 3.5 3.5 - 5.1 MMOL/L    Chloride 100 99 - 110 mMol/L    CO2 25 21 - 32 MMOL/L    BUN 16 6 - 23 MG/DL    Creatinine 0.8 (L) 0.9 - 1.3 MG/DL    Est, Glom Filt Rate >60 >60 mL/min/1.73m2    Glucose 106 (H) 70 - 99 MG/DL    Calcium 9.7 8.3 - 10.6 MG/DL    Albumin 5.0 3.4 - 5.0 GM/DL    Total Protein 8.0 6.4 - 8.2 GM/DL    Total Bilirubin 1.1 (H) 0.0 - 1.0 MG/DL    ALT 33 10 - 40 U/L    AST 65 (H) 15 - 37 IU/L    Alkaline Phosphatase 80 40 - 129 IU/L    Anion Gap 12 4 - 16   Lipase   Result Value Ref Range    Lipase 18 13 - 60 IU/L      Radiographs (if obtained):  Radiologist's Report Reviewed:  CT ABDOMEN PELVIS W IV CONTRAST Additional Contrast? None   Final Result   1. Focal subcutaneous stranding in the left lower quadrant lateral abdominal   wall near the left iliac crest.  The appearance is indeterminate but could be   seen with medicinal injection, contusion, or cellulitis. 2. Otherwise no acute findings in the abdomen or pelvis.                EKG (if obtained): (All EKG's are interpreted by myself in the absence of a cardiologist)    Medications   iopamidol (ISOVUE-370) 76 % injection 80 mL (80 mLs IntraVENous Given 1/8/23 1719)   ketorolac (TORADOL) injection 30 mg (30 mg IntraVENous Given 1/8/23 1810)       MDM:  Patient is a 77-year-old male with history of ADHD bipolar antisocial personality disorder OCD PTSD schizophrenia manic depression who presents to the emergency department complaint of left side abdominal pain started 3 days ago with history of underlying chronic abdominal pain and Crohn's disease. History obtained from patient. Patient brought in via EMS today for evaluation. Physical exam he does have some left lower abdominal pain. Differential does include diverticulitis, colitis, intra-abdominal abscess, kidney stone, urinary tract infection. Laboratory studies were ordered as well as urinalysis and CT scan of the abdomen and pelvis to evaluate for any intra-abdominal infection. Laboratory studies unremarkable for any acute process. Patient ordered Toradol 30 mg IV for pain. CT of the abdomen pelvis reveals no acute intra-abdominal process the patient does have focal subcutaneous stranding in the left lower quadrant lateral abdominal wall near the left iliac parents indeterminate could be injection contusion cellulitis otherwise no acute findings. I did go to discuss this with patient he said no injections to the area. On physical exam there is a round hard area tender to palpation early abscess versus lymph node in that area. Patient ordered Toradol 30 mg IV. He did get some relief of his pain. I discussed with patient I will place him on Bactrim DS 1 tablet twice a day for 10 days, and Keflex 500 mg 4 tablets daily,  Antibiotic for possible early infection since he does have the stranding and inflammation on the CT scan. A prescription for ibuprofen 600 mg p.o. every 8 hours as needed for pain. He is to establish and follow-up with a primary care physician Dr. Ellie De Souza in 2 to 3 days for reevaluation. Patient return immediately to the Emergency Department for any worsening symptoms. All questions answered. Patient voiced understand agrees above treatment plan. Clinical Impression:  1.  Abdominal pain, left lower quadrant    2. Cutaneous abscess of abdominal wall      Disposition referral (if applicable):  Kumar Rios MD  [facility], [address] [place] [address]  [phone]    Schedule an appointment as soon as possible for a visit in 2 days  If symptoms worsen    [facility] Emergency Department  [address] [phone]  [phone]  Go in 1 day  If symptoms worsen  Disposition medications (if applicable):  New Prescriptions    CEPHALEXIN (KEFLEX) 500 MG CAPSULE    Take 1 capsule by mouth 4 times daily for 7 days    IBUPROFEN (ADVIL;MOTRIN) 600 MG TABLET    Take 1 tablet by mouth 3 times daily as needed for Pain    SULFAMETHOXAZOLE-TRIMETHOPRIM (BACTRIM DS) 800-160 MG PER TABLET    Take 1 tablet by mouth 2 times daily for 10 days     ED Provider Disposition Time  DISPOSITION  5:57 PM        Comment: Please note this report has been produced using speech recognition software and may contain errors related to that system including errors in grammar, punctuation, and spelling, as well as words and phrases that may be inappropriate. Efforts were made to edit the dictations.        Dipak Babcock DO  01/08/23 6643
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
## 2023-01-08 NOTE — DISCHARGE INSTR - LAB
Substance [phone]   [phone] or [address]  Intensive Outpatient and Outpatient treatment for both men & women    Lou Hammonds   [phone]                         [facility]   Intensive outpatient and residential for men & Intensive outpatient for women     [place]      [phone]                      [address] [place] [address] treatment for both men & women:   Call for insurance eligibility vs cost  [facility] [place] [phone]  [employer] [phone]  [place] [facility] [phone]    Drug and Alcohol Treatment Facilities:   Call for insurance eligibility vs cost  [address] [phone] 38  [facility] [place] [address]  [facility] [address] [place] [address]/[place] [address] [phone]  [address] [place] [address] for Addiction Treatment [phone]  [facility] [address] [facility] [address] [place] [phone]    Alcoholics Anonymous/ SAI/SHANKAR [phone] or [phone]  https://cogf. meetingfor. me/meetings or www. aacentralohio. org      Narcotics Anonymous 721-9011 or [phone]   http://sascna.org/ or www.nacentralohio. org  Cocaine Anonymous 430-983-1153 www.caohio. org   Marijuana Anonymous 546-767-1103 www.marijuana-anonymous. org     Mental Health Crisis Line: [address]: [address]: (smoking) https://ohio. quitlogix. org 800-QUIT-NOW (071-414-4216)  24/7 crisis line for [facility]: [phone]  24-hour crisis text hotline: Text the word \"4hope\" to 234-819 for crisis support    Information & Referral for [facility]  Referral line to connect with available resources/services  [facility] [phone] or [place] [phone]

## 2023-01-08 NOTE — ED PROVIDER NOTES
Triage Chief Complaint:   Mental Health Problem (Started using Meth again and using cocaine. States that he wants help for that and to talk to someone about a recent loss of a child. Pt denies SI/HI)    [place]:  Jennifer Martinez is a 23 y.o. male that presents wanting help with drug addiction. States that he has been using cocaine and methamphetamines. States that he wants information on treatment centers. States that he has had increasing anxiety but denies any suicidal or homicidal ideation. Also states that he is homeless and wants some information on places to go. No other acute complaints. ROS:  At least 10 systems reviewed and otherwise acutely negative except as in the [address] Past Medical History:   Diagnosis Date    ADHD     ADHD (attention deficit hyperactivity disorder)     Antisocial personality disorder (HCC)     Bipolar disorder (HCC)     Conduct disorder     Depression     Manic depression ([facility]     OCD (obsessive compulsive disorder)     PTSD (post-traumatic stress disorder)     Schizophrenia ([facility]      Past Surgical History:   Procedure Laterality Date    SHOULDER ARTHROSCOPY Right 6-    TEAR DUCT SURGERY      flushed     Family History   Adopted: Yes     Social History     Socioeconomic History    Marital status: Single     Spouse name: Not on file    Number of children: Not on file    Years of education: Not on file    Highest education level: Not on file   Occupational History    Not on file   Tobacco Use    Smoking status: Every Day     Packs/day: 0.50     Types: Cigarettes    Smokeless tobacco: Former   Vaping Use    Vaping Use: Never used   Substance and Sexual Activity    Alcohol use:  Yes     Alcohol/week: 6.0 standard drinks     Types: 6 Cans of beer per week     Comment: every now and then     Drug use: Yes     Types: Marijuana Gabbi Wadsworth     Comment: Meth    Sexual activity: Yes     Partners: Female   Other Topics Concern    Not on file   Social History Narrative    Not on file Social Determinants of Health     Financial Resource Strain: Not on file   Food Insecurity: Not on file   Transportation Needs: Not on file   Physical Activity: Not on file   Stress: Not on file   Social Connections: Not on file   Intimate Partner Violence: Not on file   Housing Stability: Not on file     No current facility-administered medications for this encounter. Current Outpatient Medications   Medication Sig Dispense Refill    dicyclomine (BENTYL) 10 MG capsule Take 1 capsule by mouth 4 times daily 40 capsule 0     Allergies   Allergen Reactions    Depakote [Divalproex Sodium] Hallucinations     Caused pt to be violent     Seroquel [Quetiapine Fumarate] Other (See Comments)     States he had restless legs and became violent        Nursing Notes Reviewed    Physical Exam:  ED Triage Vitals [01/08/23 0151]   Enc Vitals Group      BP (!) 129/93      Heart Rate (!) 101      Resp 18      Temp 98.4 °F (36.9 °C)      Temp Source Oral      SpO2 97 %      Weight       Height       Head Circumference       Peak Flow       Pain Score       Pain Loc       Pain Edu? Excl. in [address]? GENERAL APPEARANCE: Awake and alert. Cooperative. No acute distress. EYES: EOM's grossly intact. Conjunctiva anicteric. HEENT: NC/AT, Mucous membranes are moist. Tolerates saliva. No trismus. HEART:  Extremities pink  LUNGS: Respirations unlabored. Even chest rise bilaterally  ABDOMEN: Non distended. EXTREMITIES: No acute deformities. SKIN: Dry  NEUROLOGICAL: No gross facial drooping. Moves all 4 extremities spontaneously. PSYCHIATRIC: Normal mood. Appropriate affect. Denies SI or HI. Linear thought process. I have reviewed and interpreted all of the currently available lab results from this visit (if applicable):  No results found for this visit on 01/08/23.    Radiographs (if obtained):  [] The following radiograph was interpreted by myself in the absence of a radiologist:  [] Radiologist's Report Reviewed:    Medical Decision Making and ED Course:    CC/HPI Summary, DDx, ED Course, and Reassessment: Patient presents as above. He is in no acute distress. Vital signs within appropriate ranges. No findings consistent with acute psychiatric emergency that requires further evaluation in the emergency department or urgent inpatient hospitalization. Case management to speak with the patient to get resources for substance abuse treatment centers and homeless shelters. Patient discharged in good condition. History from : Patient    Limitations to history : None    Patient was given the following medications:  Medications   LORazepam (ATIVAN) tablet 1 mg (1 mg Oral Given 1/8/23 0214)       Independent Imaging Interpretation by me:     EKG (if obtained): (All EKG's are interpreted by myself in the absence of a cardiologist)     Chronic conditions affecting care:     Discussion with Other Profesionals : Case Management      Social Determinants : Patient is Homeless    Records Reviewed : None    Disposition Considerations (tests considered but not done, Shared Decision Making, Pt Expectation of Test or Tx.):     Appropriate for outpatient management      I am the Primary Clinician of Record. Clinical Impression:  1.  Polysubstance abuse ([facility]      (Please note that portions of this note may have been completed with a voice recognition program. Efforts were made to edit the dictations but occasionally words are mis-transcribed.)    MD Afshan Mays MD  01/08/23 9797

## 2023-01-08 NOTE — DISCHARGE INSTRUCTIONS
Establish and follow-up with a primary care physician in 2 to 3 days reevaluation. Call for an appointment  Take Bactrim and Keflex antibiotic as prescribed and directed  Take ibuprofen as prescribed for inflammation pain and swelling  Return to the emergency department AMI increased pain fever chills nausea vomiting dizzy lightheadedness or worsening symptoms.

## 2023-01-08 NOTE — CARE COORDINATION
1/8/23 18:22 Patient was back in to ED this evening with abdominal pain. Patient found this CM and stated that he had called [facility] this morning and Mimi Gramajo w/Polina has worked out that patient is able to come to inpatient substance abuse treatment at [facility].  [facility] will be picking up patient vis [facility] @ 06:00 pm in parking lot of hospital.

## 2023-02-21 ENCOUNTER — HOSPITAL ENCOUNTER (EMERGENCY)
Age: 23
Discharge: LEFT BEFORE DISCHARGE | End: 2023-02-21

## 2023-02-21 VITALS
SYSTOLIC BLOOD PRESSURE: 136 MMHG | OXYGEN SATURATION: 97 % | HEART RATE: 92 BPM | RESPIRATION RATE: 18 BRPM | DIASTOLIC BLOOD PRESSURE: 91 MMHG | TEMPERATURE: 98.4 F

## 2023-02-21 NOTE — ED NOTES
Patient seen leaving Ed, states that he does not want to be seen, states that he will take his bentyl at home.   LWT paper work signed.       Alexandra Coleman RN  02/21/23 0223

## 2023-05-07 ENCOUNTER — HOSPITAL ENCOUNTER (EMERGENCY)
Age: 23
Discharge: HOME | End: 2023-05-07
Attending: EMERGENCY MEDICINE
Payer: COMMERCIAL

## 2023-05-07 VITALS
RESPIRATION RATE: 19 BRPM | OXYGEN SATURATION: 98 % | TEMPERATURE: 98.2 F | HEART RATE: 115 BPM | SYSTOLIC BLOOD PRESSURE: 128 MMHG | DIASTOLIC BLOOD PRESSURE: 93 MMHG

## 2023-05-07 DIAGNOSIS — F19.10 POLYSUBSTANCE ABUSE (HCC): ICD-10-CM

## 2023-05-07 DIAGNOSIS — F30.10 MANIC BEHAVIOR (HCC): Primary | ICD-10-CM

## 2023-05-07 LAB
ACETAMINOPHEN LEVEL: <5 UG/ML (ref 15–30)
ALBUMIN SERPL-MCNC: 4.8 GM/DL (ref 3.4–5)
ALCOHOL SCREEN SERUM: <0.01 %WT/VOL
ALP BLD-CCNC: 91 IU/L (ref 40–129)
ALT SERPL-CCNC: 67 U/L (ref 10–40)
AMPHETAMINES: ABNORMAL
ANION GAP SERPL CALCULATED.3IONS-SCNC: 15 MMOL/L (ref 4–16)
AST SERPL-CCNC: 236 IU/L (ref 15–37)
BARBITURATE SCREEN URINE: NEGATIVE
BASOPHILS ABSOLUTE: 0 K/CU MM
BASOPHILS RELATIVE PERCENT: 0.4 % (ref 0–1)
BENZODIAZEPINE SCREEN, URINE: NEGATIVE
BILIRUB SERPL-MCNC: 0.6 MG/DL (ref 0–1)
BUN SERPL-MCNC: 15 MG/DL (ref 6–23)
CALCIUM SERPL-MCNC: 9.4 MG/DL (ref 8.3–10.6)
CANNABINOID SCREEN URINE: ABNORMAL
CHLORIDE BLD-SCNC: 99 MMOL/L (ref 99–110)
CO2: 23 MMOL/L (ref 21–32)
COCAINE METABOLITE: ABNORMAL
CREAT SERPL-MCNC: 0.9 MG/DL (ref 0.9–1.3)
DIFFERENTIAL TYPE: ABNORMAL
DOSE AMOUNT: ABNORMAL
DOSE AMOUNT: ABNORMAL
DOSE TIME: ABNORMAL
DOSE TIME: ABNORMAL
EOSINOPHILS ABSOLUTE: 0.1 K/CU MM
EOSINOPHILS RELATIVE PERCENT: 0.9 % (ref 0–3)
FENTANYL URINE: NEGATIVE
GFR SERPL CREATININE-BSD FRML MDRD: >60 ML/MIN/1.73M2
GLUCOSE SERPL-MCNC: 110 MG/DL (ref 70–99)
HCT VFR BLD CALC: 44.8 % (ref 42–52)
HEMOGLOBIN: 14.8 GM/DL (ref 13.5–18)
IMMATURE NEUTROPHIL %: 0.4 % (ref 0–0.43)
LYMPHOCYTES ABSOLUTE: 2.7 K/CU MM
LYMPHOCYTES RELATIVE PERCENT: 26 % (ref 24–44)
MCH RBC QN AUTO: 29.1 PG (ref 27–31)
MCHC RBC AUTO-ENTMCNC: 33 % (ref 32–36)
MCV RBC AUTO: 88 FL (ref 78–100)
MONOCYTES ABSOLUTE: 0.7 K/CU MM
MONOCYTES RELATIVE PERCENT: 6.3 % (ref 0–4)
NUCLEATED RBC %: 0 %
OPIATES, URINE: NEGATIVE
OXYCODONE: NEGATIVE
PDW BLD-RTO: 12.6 % (ref 11.7–14.9)
PLATELET # BLD: 261 K/CU MM (ref 140–440)
PMV BLD AUTO: 8.9 FL (ref 7.5–11.1)
POTASSIUM SERPL-SCNC: 3.5 MMOL/L (ref 3.5–5.1)
RBC # BLD: 5.09 M/CU MM (ref 4.6–6.2)
SALICYLATE LEVEL: <0.3 MG/DL (ref 15–30)
SEGMENTED NEUTROPHILS ABSOLUTE COUNT: 7 K/CU MM
SEGMENTED NEUTROPHILS RELATIVE PERCENT: 66 % (ref 36–66)
SODIUM BLD-SCNC: 137 MMOL/L (ref 135–145)
TOTAL IMMATURE NEUTOROPHIL: 0.04 K/CU MM
TOTAL NUCLEATED RBC: 0 K/CU MM
TOTAL PROTEIN: 8.2 GM/DL (ref 6.4–8.2)
TOTAL RETICULOCYTE COUNT: 0.08 K/CU MM
WBC # BLD: 10.5 K/CU MM (ref 4–10.5)

## 2023-05-07 PROCEDURE — G0480 DRUG TEST DEF 1-7 CLASSES: HCPCS

## 2023-05-07 PROCEDURE — 80307 DRUG TEST PRSMV CHEM ANLYZR: CPT

## 2023-05-07 PROCEDURE — 85025 COMPLETE CBC W/AUTO DIFF WBC: CPT

## 2023-05-07 PROCEDURE — 80053 COMPREHEN METABOLIC PANEL: CPT

## 2023-05-07 PROCEDURE — 99285 EMERGENCY DEPT VISIT HI MDM: CPT

## 2023-05-07 RX ORDER — OMEPRAZOLE 20 MG/1
20 CAPSULE, DELAYED RELEASE ORAL DAILY
COMMUNITY

## 2023-11-11 NOTE — ED PROVIDER NOTES
Schizophrenia [facility])      Past Surgical History:   Procedure Laterality Date    SHOULDER ARTHROSCOPY Right 6-    TEAR DUCT SURGERY      flushed     Family History   Adopted: Yes     Social History     Socioeconomic History    Marital status: Single     Spouse name: Not on file    Number of children: Not on file    Years of education: Not on file    Highest education level: Not on file   Occupational History    Not on file   Tobacco Use    Smoking status: Former     Packs/day: .5     Types: Cigarettes    Smokeless tobacco: Former   Vaping Use    Vaping Use: Never used   Substance and Sexual Activity    Alcohol use: Yes     Alcohol/week: 6.0 standard drinks of alcohol     Types: 6 Cans of beer per week     Comment: every now and then     Drug use: Yes     Types: Marijuana Graciella Muzzy)    Sexual activity: Yes     Partners: Female   Other Topics Concern    Not on file   Social History Narrative    Not on file     Social Determinants of Health     Financial Resource Strain: Not on file   Food Insecurity: Not on file   Transportation Needs: Not on file   Physical Activity: Not on file   Stress: Not on file   Social Connections: Not on file   Intimate Partner Violence: Not on file   Housing Stability: Not on file     No current facility-administered medications for this encounter.      Current Outpatient Medications   Medication Sig Dispense Refill    omeprazole (PRILOSEC) 20 MG delayed release capsule Take 1 capsule by mouth daily      ibuprofen (ADVIL;MOTRIN) 600 MG tablet Take 1 tablet by mouth 3 times daily as needed for Pain 30 tablet 0    dicyclomine (BENTYL) 10 MG capsule Take 1 capsule by mouth 4 times daily (Patient not taking: Reported on 5/7/2023) 40 capsule 0     Allergies   Allergen Reactions    Depakote [Divalproex Sodium] Hallucinations     Caused pt to be violent     Seroquel [Quetiapine Fumarate] Other (See Comments)     States he had restless legs and became violent        Nursing Notes bicarb, calcium, magnesium, normal saline as well as Narcan. Bedside ultrasound reveals no pericardial effusion. There is no cardiac activity on bedside ultrasound. Patient remained in asystole other than 1 pulse check on patient did have 1 wide-complex beat during a 10-second pulse check. ACLS was continued for 45 to 50 minutes. Patient continues to be in asystole. Patient's pupils are blown and nonreactive. Time of death was called at 8:30 PM    Clinical Impression:  1. Cardiopulmonary arrest [facility])          Comment: Please note this report has been produced using speech recognition software and may contain errors related to that system including errors in grammar, punctuation, and spelling, as well as words and phrases that may be inappropriate. Efforts were made to edit the dictations.         Latrice Orozco MD  11/10/23 0211

## 2023-11-11 NOTE — ED NOTES
Mother and Deana Lutz at bedside, permission given to contact girlfriend, Magda Hooper, and give update on pt. [phone], VM left at this time.       Chago Patel RN  11/10/23 1888
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
## 2023-11-11 NOTE — ED NOTES
Girlfriend, updated and mother is going to get her to bring to ED.       Tony Clay RN  11/10/23 2307

## 2023-11-22 LAB
GLUCOSE BLD-MCNC: 101 MG/DL (ref 70–99)
PERFORMED ON: ABNORMAL